# Patient Record
Sex: FEMALE | Race: WHITE
[De-identification: names, ages, dates, MRNs, and addresses within clinical notes are randomized per-mention and may not be internally consistent; named-entity substitution may affect disease eponyms.]

---

## 2018-02-13 ENCOUNTER — HOSPITAL ENCOUNTER (OUTPATIENT)
Age: 73
End: 2018-02-13
Payer: MEDICARE

## 2018-02-13 DIAGNOSIS — J40: Primary | ICD-10-CM

## 2018-02-13 PROCEDURE — 71046 X-RAY EXAM CHEST 2 VIEWS: CPT

## 2018-04-27 ENCOUNTER — HOSPITAL ENCOUNTER (OUTPATIENT)
Age: 73
End: 2018-04-27
Payer: MEDICARE

## 2018-04-27 DIAGNOSIS — E78.00: Primary | ICD-10-CM

## 2018-04-27 LAB
ALBUMIN LEVEL: 3.4 GM/DL (ref 3.4–5)
ALP ISO SERPL-ACNC: 62 U/L (ref 46–116)
ALT SERPLBLD-CCNC: 22 U/L (ref 12–78)
AST SERPL QL: 20 U/L (ref 15–37)
BILIRUB DIRECT SERPL-MCNC: 0.2 MG/DL (ref 0–0.2)
BILIRUBIN,TOTAL: 0.6 MG/DL (ref 0.2–1)
CHOLEST SPEC-SCNC: 94 MG/DL (ref 140–200)
HDLC SERPL-MCNC: 39 MG/DL (ref 29–89)
PROT SERPL-MCNC: 6.6 GM/DL (ref 6.4–8.2)
TRIGLYCERIDES: 147 MG/DL (ref 30–200)

## 2018-04-27 PROCEDURE — 80061 LIPID PANEL: CPT

## 2018-04-27 PROCEDURE — 36415 COLL VENOUS BLD VENIPUNCTURE: CPT

## 2018-04-27 PROCEDURE — 80076 HEPATIC FUNCTION PANEL: CPT

## 2018-05-10 ENCOUNTER — HOSPITAL ENCOUNTER (OUTPATIENT)
Age: 73
End: 2018-05-10
Payer: MEDICARE

## 2018-05-10 DIAGNOSIS — M25.572: Primary | ICD-10-CM

## 2018-05-10 PROCEDURE — 73610 X-RAY EXAM OF ANKLE: CPT

## 2018-07-09 ENCOUNTER — HOSPITAL ENCOUNTER (OUTPATIENT)
Age: 73
End: 2018-07-09
Payer: MEDICARE

## 2018-07-09 DIAGNOSIS — Z12.31: Primary | ICD-10-CM

## 2018-07-09 DIAGNOSIS — Z78.0: ICD-10-CM

## 2018-07-09 PROCEDURE — 77067 SCR MAMMO BI INCL CAD: CPT

## 2018-07-09 PROCEDURE — 77080 DXA BONE DENSITY AXIAL: CPT

## 2018-10-25 ENCOUNTER — HOSPITAL ENCOUNTER (OUTPATIENT)
Age: 73
End: 2018-10-25
Payer: MEDICARE

## 2018-10-25 DIAGNOSIS — E78.00: Primary | ICD-10-CM

## 2018-10-25 LAB
ALBUMIN LEVEL: 3.4 GM/DL (ref 3.4–5)
ALP ISO SERPL-ACNC: 52 U/L (ref 46–116)
ALT SERPLBLD-CCNC: 33 U/L (ref 12–78)
AST SERPL QL: 20 U/L (ref 15–37)
BILIRUB DIRECT SERPL-MCNC: 0.1 MG/DL (ref 0–0.2)
BILIRUB INDIRECT SERPL-MCNC: 0.3 MG/DL (ref 0–0.9)
BILIRUBIN,TOTAL: 0.4 MG/DL (ref 0.2–1)
CHOLEST SPEC-SCNC: 90 MG/DL (ref 140–200)
HDLC SERPL-MCNC: 54 MG/DL (ref 29–89)
PROT SERPL-MCNC: 6.3 GM/DL (ref 6.4–8.2)
TRIGLYCERIDES: 77 MG/DL (ref 30–200)

## 2018-10-25 PROCEDURE — 80061 LIPID PANEL: CPT

## 2018-10-25 PROCEDURE — 36415 COLL VENOUS BLD VENIPUNCTURE: CPT

## 2018-10-25 PROCEDURE — 80076 HEPATIC FUNCTION PANEL: CPT

## 2019-01-07 ENCOUNTER — HOSPITAL ENCOUNTER (OUTPATIENT)
Age: 74
End: 2019-01-07
Payer: MEDICARE

## 2019-01-07 DIAGNOSIS — M62.50: Primary | ICD-10-CM

## 2019-01-07 DIAGNOSIS — L89.90: ICD-10-CM

## 2019-01-07 PROCEDURE — 72050 X-RAY EXAM NECK SPINE 4/5VWS: CPT

## 2019-01-07 PROCEDURE — 71046 X-RAY EXAM CHEST 2 VIEWS: CPT

## 2019-01-11 ENCOUNTER — HOSPITAL ENCOUNTER (OUTPATIENT)
Age: 74
End: 2019-01-11
Payer: MEDICARE

## 2019-01-11 DIAGNOSIS — J44.0: Primary | ICD-10-CM

## 2019-01-11 LAB
BUN SERPL-MCNC: 18 MG/DL (ref 7–18)
CREAT BLD-SCNC: 0.9 MG/DL (ref 0.55–1.02)
ESTIMATED GLOMERULAR FILT RATE: 61 ML/MIN (ref 60–?)
GFR (AFRICAN AMERICAN): 74 ML/MIN (ref 60–?)

## 2019-01-11 PROCEDURE — 84520 ASSAY OF UREA NITROGEN: CPT

## 2019-01-11 PROCEDURE — 82565 ASSAY OF CREATININE: CPT

## 2019-01-11 PROCEDURE — 36415 COLL VENOUS BLD VENIPUNCTURE: CPT

## 2019-01-14 ENCOUNTER — HOSPITAL ENCOUNTER (OUTPATIENT)
Age: 74
End: 2019-01-14
Payer: MEDICARE

## 2019-01-14 DIAGNOSIS — R93.89: Primary | ICD-10-CM

## 2019-01-14 DIAGNOSIS — R91.8: ICD-10-CM

## 2019-01-14 PROCEDURE — 71270 CT THORAX DX C-/C+: CPT

## 2019-02-25 ENCOUNTER — HOSPITAL ENCOUNTER (OUTPATIENT)
Age: 74
End: 2019-02-25
Payer: MEDICARE

## 2019-02-25 DIAGNOSIS — E87.6: Primary | ICD-10-CM

## 2019-02-25 LAB — POTASSIUM: 4.1 MMOL/L (ref 3.5–5.1)

## 2019-02-25 PROCEDURE — 84132 ASSAY OF SERUM POTASSIUM: CPT

## 2019-02-25 PROCEDURE — 36415 COLL VENOUS BLD VENIPUNCTURE: CPT

## 2019-04-09 ENCOUNTER — HOSPITAL ENCOUNTER (OUTPATIENT)
Age: 74
End: 2019-04-09
Payer: MEDICARE

## 2019-04-09 DIAGNOSIS — R63.4: Primary | ICD-10-CM

## 2019-04-09 DIAGNOSIS — R10.30: ICD-10-CM

## 2019-04-09 LAB
BUN SERPL-MCNC: 23 MG/DL (ref 7–18)
CREAT BLD-SCNC: 1.03 MG/DL (ref 0.55–1.02)
ESTIMATED GLOMERULAR FILT RATE: 52 ML/MIN (ref 60–?)
GFR (AFRICAN AMERICAN): 63 ML/MIN (ref 60–?)

## 2019-04-09 PROCEDURE — 82565 ASSAY OF CREATININE: CPT

## 2019-04-09 PROCEDURE — 84520 ASSAY OF UREA NITROGEN: CPT

## 2019-04-09 PROCEDURE — 36415 COLL VENOUS BLD VENIPUNCTURE: CPT

## 2019-04-09 PROCEDURE — 74177 CT ABD & PELVIS W/CONTRAST: CPT

## 2019-04-11 ENCOUNTER — HOSPITAL ENCOUNTER (OUTPATIENT)
Age: 74
End: 2019-04-11
Payer: MEDICARE

## 2019-04-11 DIAGNOSIS — R22.1: Primary | ICD-10-CM

## 2019-04-11 PROCEDURE — 76536 US EXAM OF HEAD AND NECK: CPT

## 2019-04-15 ENCOUNTER — HOSPITAL ENCOUNTER (OUTPATIENT)
Age: 74
End: 2019-04-15
Payer: MEDICARE

## 2019-04-15 DIAGNOSIS — R93.5: Primary | ICD-10-CM

## 2019-04-15 PROCEDURE — 76376 3D RENDER W/INTRP POSTPROCES: CPT

## 2019-04-15 PROCEDURE — A9576 INJ PROHANCE MULTIPACK: HCPCS

## 2019-04-15 PROCEDURE — 74183 MRI ABD W/O CNTR FLWD CNTR: CPT

## 2019-06-05 ENCOUNTER — HOSPITAL ENCOUNTER (OUTPATIENT)
Age: 74
End: 2019-06-05
Payer: MEDICARE

## 2019-06-05 DIAGNOSIS — R29.6: Primary | ICD-10-CM

## 2019-06-05 DIAGNOSIS — R26.89: ICD-10-CM

## 2019-06-05 PROCEDURE — 70450 CT HEAD/BRAIN W/O DYE: CPT

## 2019-11-14 ENCOUNTER — HOSPITAL ENCOUNTER (OUTPATIENT)
Age: 74
End: 2019-11-14
Payer: MEDICARE

## 2019-11-14 DIAGNOSIS — E78.00: Primary | ICD-10-CM

## 2019-11-14 LAB
ALBUMIN LEVEL: 3.2 GM/DL (ref 3.4–5)
ALP ISO SERPL-ACNC: 69 U/L (ref 46–116)
ALT SERPLBLD-CCNC: 45 U/L (ref 12–78)
AST SERPL QL: 17 U/L (ref 15–37)
BILIRUB DIRECT SERPL-MCNC: 0.2 MG/DL (ref 0–0.2)
BILIRUB INDIRECT SERPL-MCNC: 0.5 MG/DL (ref 0–0.9)
BILIRUBIN,TOTAL: 0.7 MG/DL (ref 0.2–1)
CHOLEST SPEC-SCNC: 104 MG/DL (ref 140–200)
HDLC SERPL-MCNC: 44 MG/DL (ref 29–89)
PROT SERPL-MCNC: 6.2 GM/DL (ref 6.4–8.2)
TRIGLYCERIDES: 105 MG/DL (ref 30–200)

## 2019-11-14 PROCEDURE — 36415 COLL VENOUS BLD VENIPUNCTURE: CPT

## 2019-11-14 PROCEDURE — 80061 LIPID PANEL: CPT

## 2019-11-14 PROCEDURE — 80076 HEPATIC FUNCTION PANEL: CPT

## 2020-01-01 ENCOUNTER — INPATIENT HOSPITAL (OUTPATIENT)
Dept: RURAL HOSPITAL 5 | Facility: HOSPITAL | Age: 75
End: 2020-01-01
Payer: COMMERCIAL

## 2020-01-01 ENCOUNTER — ON CAMPUS - OUTPATIENT (OUTPATIENT)
Dept: RURAL HOSPITAL 6 | Facility: HOSPITAL | Age: 75
End: 2020-01-01
Payer: COMMERCIAL

## 2020-01-01 DIAGNOSIS — K44.9 DIAPHRAGMATIC HERNIA WITHOUT OBSTRUCTION OR GANGRENE: ICD-10-CM

## 2020-01-01 DIAGNOSIS — K86.89 OTHER SPECIFIED DISEASES OF PANCREAS: ICD-10-CM

## 2020-01-01 DIAGNOSIS — R11.2 NAUSEA WITH VOMITING, UNSPECIFIED: ICD-10-CM

## 2020-01-01 DIAGNOSIS — R06.00 DYSPNEA, UNSPECIFIED: ICD-10-CM

## 2020-01-01 DIAGNOSIS — R10.9 UNSPECIFIED ABDOMINAL PAIN: ICD-10-CM

## 2020-01-01 DIAGNOSIS — R11.0 NAUSEA: ICD-10-CM

## 2020-01-01 DIAGNOSIS — K22.4 DYSKINESIA OF ESOPHAGUS: ICD-10-CM

## 2020-01-01 DIAGNOSIS — R13.10 DYSPHAGIA, UNSPECIFIED: ICD-10-CM

## 2020-01-01 DIAGNOSIS — K21.9 GASTRO-ESOPHAGEAL REFLUX DISEASE WITHOUT ESOPHAGITIS: ICD-10-CM

## 2020-01-01 DIAGNOSIS — K30 FUNCTIONAL DYSPEPSIA: ICD-10-CM

## 2020-01-01 DIAGNOSIS — R10.13 EPIGASTRIC PAIN: ICD-10-CM

## 2020-01-01 PROCEDURE — 43261 ENDO CHOLANGIOPANCREATOGRAPH: CPT | Performed by: INTERNAL MEDICINE

## 2020-01-01 PROCEDURE — 43239 EGD BIOPSY SINGLE/MULTIPLE: CPT | Mod: 59 | Performed by: INTERNAL MEDICINE

## 2020-01-01 PROCEDURE — 43249 ESOPH EGD DILATION <30 MM: CPT | Performed by: INTERNAL MEDICINE

## 2020-01-16 ENCOUNTER — HOSPITAL ENCOUNTER (OUTPATIENT)
Age: 75
End: 2020-01-16
Payer: MEDICARE

## 2020-01-16 DIAGNOSIS — R91.1: Primary | ICD-10-CM

## 2020-01-16 LAB
BUN SERPL-MCNC: 18 MG/DL (ref 7–18)
CREAT BLD-SCNC: 1.03 MG/DL (ref 0.55–1.02)
ESTIMATED GLOMERULAR FILT RATE: 52 ML/MIN (ref 60–?)
GFR (AFRICAN AMERICAN): 63 ML/MIN (ref 60–?)

## 2020-01-16 PROCEDURE — 82565 ASSAY OF CREATININE: CPT

## 2020-01-16 PROCEDURE — 84520 ASSAY OF UREA NITROGEN: CPT

## 2020-01-16 PROCEDURE — 71260 CT THORAX DX C+: CPT

## 2020-01-16 PROCEDURE — 36415 COLL VENOUS BLD VENIPUNCTURE: CPT

## 2020-01-28 ENCOUNTER — HOSPITAL ENCOUNTER (OUTPATIENT)
Age: 75
End: 2020-01-28
Payer: MEDICARE

## 2020-01-28 DIAGNOSIS — R10.9: Primary | ICD-10-CM

## 2020-01-28 DIAGNOSIS — Z87.448: ICD-10-CM

## 2020-01-28 LAB
BUN SERPL-MCNC: 32 MG/DL (ref 7–18)
CREAT BLD-SCNC: 1.41 MG/DL (ref 0.55–1.02)
ESTIMATED GLOMERULAR FILT RATE: 36 ML/MIN (ref 60–?)
GFR (AFRICAN AMERICAN): 44 ML/MIN (ref 60–?)

## 2020-01-28 PROCEDURE — 36415 COLL VENOUS BLD VENIPUNCTURE: CPT

## 2020-01-28 PROCEDURE — 82565 ASSAY OF CREATININE: CPT

## 2020-01-28 PROCEDURE — 84520 ASSAY OF UREA NITROGEN: CPT

## 2020-01-28 PROCEDURE — 74178 CT ABD&PLV WO CNTR FLWD CNTR: CPT

## 2020-02-13 ENCOUNTER — HOSPITAL ENCOUNTER (OUTPATIENT)
Age: 75
End: 2020-02-13
Payer: MEDICARE

## 2020-02-13 DIAGNOSIS — Z79.899: ICD-10-CM

## 2020-02-13 DIAGNOSIS — Z00.00: Primary | ICD-10-CM

## 2020-02-13 LAB
ALBUMIN LEVEL: 3.2 G/DL (ref 3.4–5)
ALBUMIN/GLOB SERPL: 1.2 {RATIO} (ref 1.1–1.8)
ALP ISO SERPL-ACNC: 50 U/L (ref 46–116)
ALT SERPLBLD-CCNC: 42 U/L (ref 9–52)
ANION GAP SERPL CALC-SCNC: 13.2 MEQ/L (ref 5–15)
AST SERPL QL: 28 U/L (ref 15–37)
BILIRUBIN,TOTAL: 0.4 MG/DL (ref 0.2–1)
BUN SERPL-MCNC: 18 MG/DL (ref 7–18)
CALCIUM SPEC-MCNC: 9 MG/DL (ref 8.5–10.1)
CHLORIDE SPEC-SCNC: 103 MMOL/L (ref 98–107)
CHOLEST SPEC-SCNC: 81 MG/DL (ref 140–200)
CO2 SERPL-SCNC: 35 MMOL/L (ref 21–32)
CREAT BLD-SCNC: 0.91 MG/DL (ref 0.55–1.02)
CRP SERPL HS-MCNC: < 0.2 MG/DL (ref 0–0.9)
ESTIMATED GLOMERULAR FILT RATE: 60 ML/MIN (ref 60–?)
GFR (AFRICAN AMERICAN): 73 ML/MIN (ref 60–?)
GLOBULIN SER CALC-MCNC: 2.7 GM/DL (ref 1.3–3.2)
GLUCOSE: 93 MG/DL (ref 74–106)
HCT VFR BLD CALC: 46.9 % (ref 37–47)
HDLC SERPL-MCNC: 42 MG/DL (ref 29–89)
HGB BLD-MCNC: 15.3 G/DL (ref 12.2–16.2)
MCHC RBC-ENTMCNC: 32.7 G/DL (ref 31.8–35.4)
MCV RBC: 105.1 FL (ref 81–99)
MEAN CORPUSCULAR HEMOGLOBIN: 34.4 PG (ref 27–31.2)
PLATELET # BLD: 187 K/MM3 (ref 142–424)
POTASSIUM: 4.2 MMOL/L (ref 3.5–5.1)
PROT SERPL-MCNC: 5.9 G/DL (ref 6.4–8.2)
RBC # BLD AUTO: 4.46 M/MM3 (ref 4.2–5.4)
SODIUM SPEC-SCNC: 147 MMOL/L (ref 137–145)
T4 FREE SERPL-MCNC: 1.17 NG/DL (ref 0.76–1.46)
TRIGLYCERIDES: 104 MG/DL (ref 30–200)
TSH SERPL-ACNC: 0.62 UIU/ML (ref 0.36–3.74)
WBC # BLD AUTO: 10 K/MM3 (ref 4.8–10.8)

## 2020-02-13 PROCEDURE — 85025 COMPLETE CBC W/AUTO DIFF WBC: CPT

## 2020-02-13 PROCEDURE — 84439 ASSAY OF FREE THYROXINE: CPT

## 2020-02-13 PROCEDURE — 84443 ASSAY THYROID STIM HORMONE: CPT

## 2020-02-13 PROCEDURE — 36415 COLL VENOUS BLD VENIPUNCTURE: CPT

## 2020-02-13 PROCEDURE — 80061 LIPID PANEL: CPT

## 2020-02-13 PROCEDURE — 80053 COMPREHEN METABOLIC PANEL: CPT

## 2020-02-13 PROCEDURE — 86140 C-REACTIVE PROTEIN: CPT

## 2020-05-01 ENCOUNTER — HOSPITAL ENCOUNTER (OUTPATIENT)
Age: 75
End: 2020-05-01
Payer: MEDICARE

## 2020-05-01 DIAGNOSIS — M25.511: Primary | ICD-10-CM

## 2020-05-01 DIAGNOSIS — M53.3: ICD-10-CM

## 2020-05-01 PROCEDURE — 72220 X-RAY EXAM SACRUM TAILBONE: CPT

## 2020-05-01 PROCEDURE — 73030 X-RAY EXAM OF SHOULDER: CPT

## 2020-05-11 ENCOUNTER — HOSPITAL ENCOUNTER (OUTPATIENT)
Age: 75
End: 2020-05-11
Payer: MEDICARE

## 2020-05-11 DIAGNOSIS — R22.32: Primary | ICD-10-CM

## 2020-05-11 PROCEDURE — 76882 US LMTD JT/FCL EVL NVASC XTR: CPT

## 2020-05-26 ENCOUNTER — HOSPITAL ENCOUNTER (INPATIENT)
Dept: HOSPITAL 22 - ER | Age: 75
LOS: 1 days | Discharge: HOME | DRG: 287 | End: 2020-05-27
Payer: MEDICARE

## 2020-05-26 VITALS
HEART RATE: 76 BPM | RESPIRATION RATE: 16 BRPM | DIASTOLIC BLOOD PRESSURE: 43 MMHG | OXYGEN SATURATION: 96 % | SYSTOLIC BLOOD PRESSURE: 101 MMHG

## 2020-05-26 VITALS
OXYGEN SATURATION: 96 % | TEMPERATURE: 98.06 F | DIASTOLIC BLOOD PRESSURE: 72 MMHG | HEART RATE: 167 BPM | SYSTOLIC BLOOD PRESSURE: 104 MMHG | RESPIRATION RATE: 19 BRPM

## 2020-05-26 VITALS
OXYGEN SATURATION: 94 % | SYSTOLIC BLOOD PRESSURE: 125 MMHG | TEMPERATURE: 98.24 F | HEART RATE: 60 BPM | RESPIRATION RATE: 16 BRPM | DIASTOLIC BLOOD PRESSURE: 50 MMHG

## 2020-05-26 VITALS
OXYGEN SATURATION: 97 % | RESPIRATION RATE: 18 BRPM | SYSTOLIC BLOOD PRESSURE: 100 MMHG | HEART RATE: 64 BPM | DIASTOLIC BLOOD PRESSURE: 44 MMHG

## 2020-05-26 VITALS
RESPIRATION RATE: 20 BRPM | HEART RATE: 68 BPM | SYSTOLIC BLOOD PRESSURE: 102 MMHG | OXYGEN SATURATION: 98 % | DIASTOLIC BLOOD PRESSURE: 35 MMHG | TEMPERATURE: 98.06 F

## 2020-05-26 VITALS
OXYGEN SATURATION: 95 % | TEMPERATURE: 97.7 F | DIASTOLIC BLOOD PRESSURE: 79 MMHG | HEART RATE: 188 BPM | RESPIRATION RATE: 18 BRPM | SYSTOLIC BLOOD PRESSURE: 132 MMHG

## 2020-05-26 VITALS — DIASTOLIC BLOOD PRESSURE: 53 MMHG | SYSTOLIC BLOOD PRESSURE: 115 MMHG | HEART RATE: 59 BPM

## 2020-05-26 VITALS — HEART RATE: 57 BPM

## 2020-05-26 VITALS
OXYGEN SATURATION: 98 % | SYSTOLIC BLOOD PRESSURE: 106 MMHG | HEART RATE: 156 BPM | DIASTOLIC BLOOD PRESSURE: 75 MMHG | RESPIRATION RATE: 16 BRPM

## 2020-05-26 VITALS — OXYGEN SATURATION: 97 % | HEART RATE: 74 BPM

## 2020-05-26 VITALS
SYSTOLIC BLOOD PRESSURE: 106 MMHG | OXYGEN SATURATION: 98 % | DIASTOLIC BLOOD PRESSURE: 75 MMHG | HEART RATE: 156 BPM | RESPIRATION RATE: 16 BRPM | TEMPERATURE: 98 F

## 2020-05-26 VITALS
HEART RATE: 65 BPM | RESPIRATION RATE: 17 BRPM | SYSTOLIC BLOOD PRESSURE: 113 MMHG | OXYGEN SATURATION: 97 % | DIASTOLIC BLOOD PRESSURE: 55 MMHG

## 2020-05-26 VITALS — BODY MASS INDEX: 20.7 KG/M2 | BODY MASS INDEX: 20.5 KG/M2

## 2020-05-26 VITALS — DIASTOLIC BLOOD PRESSURE: 41 MMHG | HEART RATE: 67 BPM | SYSTOLIC BLOOD PRESSURE: 109 MMHG

## 2020-05-26 VITALS — OXYGEN SATURATION: 94 %

## 2020-05-26 DIAGNOSIS — I10: ICD-10-CM

## 2020-05-26 DIAGNOSIS — I48.91: Primary | ICD-10-CM

## 2020-05-26 DIAGNOSIS — Z88.5: ICD-10-CM

## 2020-05-26 DIAGNOSIS — Z88.0: ICD-10-CM

## 2020-05-26 DIAGNOSIS — Z95.5: ICD-10-CM

## 2020-05-26 DIAGNOSIS — J44.9: ICD-10-CM

## 2020-05-26 DIAGNOSIS — Z88.1: ICD-10-CM

## 2020-05-26 DIAGNOSIS — Z79.51: ICD-10-CM

## 2020-05-26 DIAGNOSIS — I25.118: ICD-10-CM

## 2020-05-26 DIAGNOSIS — Z79.899: ICD-10-CM

## 2020-05-26 DIAGNOSIS — I25.2: ICD-10-CM

## 2020-05-26 LAB
ANION GAP SERPL CALC-SCNC: 8.9 MEQ/L (ref 5–15)
ANISOCYTOSIS BLD QL: (no result)
BUN SERPL-MCNC: 20 MG/DL (ref 7–17)
CALCIUM SPEC-MCNC: 9.8 MG/DL (ref 8.4–10.2)
CELLS COUNTED: 100
CHLORIDE SPEC-SCNC: 99 MMOL/L (ref 98–107)
CO2 SERPL-SCNC: 35 MMOL/L (ref 22–30)
COLOR UR: YELLOW
CREAT BLD-SCNC: 0.9 MG/DL (ref 0.52–1.04)
CREATININE CLEARANCE ESTIMATED: 42 ML/MIN (ref 50–200)
ESTIMATED GLOMERULAR FILT RATE: 61 ML/MIN (ref 60–?)
GFR (AFRICAN AMERICAN): 74 ML/MIN (ref 60–?)
GLUCOSE: 221 MG/DL (ref 74–100)
HCT VFR BLD CALC: 50.4 % (ref 37–47)
HGB BLD-MCNC: 16.6 G/DL (ref 12.2–16.2)
MACROCYTES BLD QL: (no result)
MANUAL DIFFERENTIAL: (no result)
MCHC RBC-ENTMCNC: 32.9 G/DL (ref 31.8–35.4)
MCV RBC: 107.1 FL (ref 81–99)
MEAN CORPUSCULAR HEMOGLOBIN: 35.2 PG (ref 27–31.2)
MICRO URNS: (no result)
PH UR: 5.5 [PH] (ref 5–8.5)
PLATELET # BLD: 253 K/MM3 (ref 142–424)
POTASSIUM: 3.9 MMOL/L (ref 3.5–5.1)
PROT UR STRIP-MCNC: (no result) MG/DL
RBC # BLD AUTO: 4.71 M/MM3 (ref 4.2–5.4)
SODIUM SPEC-SCNC: 139 MMOL/L (ref 136–145)
SP GR UR: >= 1.03 (ref 1–1.03)
SQUAMOUS URNS QL MICRO: (no result) #/HPF (ref 0–5)
T4 (THYROXINE): 12.7 UG/DL (ref 5.53–11)
TROPONIN I: 0.08 NG/ML (ref 0–0.03)
TROPONIN I: 0.16 NG/ML (ref 0–0.03)
TROPONIN I: 0.18 NG/ML (ref 0–0.03)
TROPONIN I: 0.22 NG/ML (ref 0–0.03)
TROPONIN I: < 0.01 NG/ML (ref 0–0.03)
TSH SERPL-ACNC: 3.45 UIU/ML (ref 0.47–4.68)
UROBILINOGEN UR QL: 0.2 EU/DL
WBC # BLD AUTO: 15.5 K/MM3 (ref 4.8–10.8)
WBC # UR: (no result) #/HPF (ref 0–3)

## 2020-05-26 PROCEDURE — 99152 MOD SED SAME PHYS/QHP 5/>YRS: CPT

## 2020-05-26 PROCEDURE — 96365 THER/PROPH/DIAG IV INF INIT: CPT

## 2020-05-26 PROCEDURE — 71045 X-RAY EXAM CHEST 1 VIEW: CPT

## 2020-05-26 PROCEDURE — 93458 L HRT ARTERY/VENTRICLE ANGIO: CPT

## 2020-05-26 PROCEDURE — 83735 ASSAY OF MAGNESIUM: CPT

## 2020-05-26 PROCEDURE — 93306 TTE W/DOPPLER COMPLETE: CPT

## 2020-05-26 PROCEDURE — 99153 MOD SED SAME PHYS/QHP EA: CPT

## 2020-05-26 PROCEDURE — 80061 LIPID PANEL: CPT

## 2020-05-26 PROCEDURE — 99284 EMERGENCY DEPT VISIT MOD MDM: CPT

## 2020-05-26 PROCEDURE — 85347 COAGULATION TIME ACTIVATED: CPT

## 2020-05-26 PROCEDURE — 93005 ELECTROCARDIOGRAM TRACING: CPT

## 2020-05-26 PROCEDURE — C1725 CATH, TRANSLUMIN NON-LASER: HCPCS

## 2020-05-26 PROCEDURE — 81001 URINALYSIS AUTO W/SCOPE: CPT

## 2020-05-26 PROCEDURE — 96375 TX/PRO/DX INJ NEW DRUG ADDON: CPT

## 2020-05-26 PROCEDURE — 84484 ASSAY OF TROPONIN QUANT: CPT

## 2020-05-26 PROCEDURE — 80048 BASIC METABOLIC PNL TOTAL CA: CPT

## 2020-05-26 PROCEDURE — 84436 ASSAY OF TOTAL THYROXINE: CPT

## 2020-05-26 PROCEDURE — 94640 AIRWAY INHALATION TREATMENT: CPT

## 2020-05-26 PROCEDURE — 85025 COMPLETE CBC W/AUTO DIFF WBC: CPT

## 2020-05-26 PROCEDURE — 84443 ASSAY THYROID STIM HORMONE: CPT

## 2020-05-26 PROCEDURE — 36415 COLL VENOUS BLD VENIPUNCTURE: CPT

## 2020-05-26 PROCEDURE — C1769 GUIDE WIRE: HCPCS

## 2020-05-26 PROCEDURE — 85007 BL SMEAR W/DIFF WBC COUNT: CPT

## 2020-05-26 NOTE — PC.NURSE
PATIENT IS RESTING IN BED AT THIS TIME.  1235 TROPONIN WAS INCREASED FROM PREVIOUS.  CALLED [employer] [facility] BECAUSE THERE ARE NO OTHER FOLLOW UP TROP ORDERED.  PER [employer] I ORDERED ONE FOR 1535.  ALSO ORDERED A UA TO LOOK FOR INFECTION SOURCE.

## 2020-05-27 VITALS
RESPIRATION RATE: 18 BRPM | OXYGEN SATURATION: 87 % | DIASTOLIC BLOOD PRESSURE: 50 MMHG | SYSTOLIC BLOOD PRESSURE: 129 MMHG | HEART RATE: 67 BPM

## 2020-05-27 VITALS
DIASTOLIC BLOOD PRESSURE: 65 MMHG | OXYGEN SATURATION: 92 % | SYSTOLIC BLOOD PRESSURE: 149 MMHG | RESPIRATION RATE: 18 BRPM | HEART RATE: 72 BPM

## 2020-05-27 VITALS
DIASTOLIC BLOOD PRESSURE: 48 MMHG | SYSTOLIC BLOOD PRESSURE: 131 MMHG | OXYGEN SATURATION: 96 % | RESPIRATION RATE: 20 BRPM | HEART RATE: 70 BPM

## 2020-05-27 VITALS
SYSTOLIC BLOOD PRESSURE: 152 MMHG | RESPIRATION RATE: 19 BRPM | DIASTOLIC BLOOD PRESSURE: 65 MMHG | OXYGEN SATURATION: 93 % | TEMPERATURE: 98.06 F | HEART RATE: 80 BPM

## 2020-05-27 VITALS
OXYGEN SATURATION: 93 % | HEART RATE: 70 BPM | DIASTOLIC BLOOD PRESSURE: 65 MMHG | SYSTOLIC BLOOD PRESSURE: 148 MMHG | RESPIRATION RATE: 18 BRPM

## 2020-05-27 VITALS
SYSTOLIC BLOOD PRESSURE: 137 MMHG | RESPIRATION RATE: 20 BRPM | HEART RATE: 69 BPM | DIASTOLIC BLOOD PRESSURE: 63 MMHG | OXYGEN SATURATION: 100 %

## 2020-05-27 VITALS
DIASTOLIC BLOOD PRESSURE: 56 MMHG | OXYGEN SATURATION: 99 % | RESPIRATION RATE: 20 BRPM | HEART RATE: 69 BPM | SYSTOLIC BLOOD PRESSURE: 124 MMHG

## 2020-05-27 VITALS
OXYGEN SATURATION: 93 % | RESPIRATION RATE: 16 BRPM | SYSTOLIC BLOOD PRESSURE: 125 MMHG | HEART RATE: 70 BPM | TEMPERATURE: 98.96 F | DIASTOLIC BLOOD PRESSURE: 42 MMHG

## 2020-05-27 VITALS
OXYGEN SATURATION: 98 % | RESPIRATION RATE: 20 BRPM | SYSTOLIC BLOOD PRESSURE: 131 MMHG | HEART RATE: 73 BPM | DIASTOLIC BLOOD PRESSURE: 49 MMHG

## 2020-05-27 VITALS
HEART RATE: 68 BPM | DIASTOLIC BLOOD PRESSURE: 63 MMHG | SYSTOLIC BLOOD PRESSURE: 148 MMHG | RESPIRATION RATE: 18 BRPM | OXYGEN SATURATION: 91 %

## 2020-05-27 VITALS
DIASTOLIC BLOOD PRESSURE: 62 MMHG | RESPIRATION RATE: 20 BRPM | HEART RATE: 69 BPM | OXYGEN SATURATION: 91 % | SYSTOLIC BLOOD PRESSURE: 144 MMHG

## 2020-05-27 VITALS
RESPIRATION RATE: 16 BRPM | OXYGEN SATURATION: 86 % | DIASTOLIC BLOOD PRESSURE: 51 MMHG | HEART RATE: 66 BPM | SYSTOLIC BLOOD PRESSURE: 128 MMHG

## 2020-05-27 VITALS
SYSTOLIC BLOOD PRESSURE: 137 MMHG | DIASTOLIC BLOOD PRESSURE: 64 MMHG | RESPIRATION RATE: 18 BRPM | HEART RATE: 71 BPM | OXYGEN SATURATION: 94 %

## 2020-05-27 VITALS
HEART RATE: 65 BPM | RESPIRATION RATE: 18 BRPM | DIASTOLIC BLOOD PRESSURE: 60 MMHG | SYSTOLIC BLOOD PRESSURE: 136 MMHG | OXYGEN SATURATION: 88 %

## 2020-05-27 VITALS
RESPIRATION RATE: 18 BRPM | HEART RATE: 61 BPM | OXYGEN SATURATION: 86 % | SYSTOLIC BLOOD PRESSURE: 127 MMHG | DIASTOLIC BLOOD PRESSURE: 52 MMHG

## 2020-05-27 VITALS
HEART RATE: 70 BPM | TEMPERATURE: 98 F | DIASTOLIC BLOOD PRESSURE: 55 MMHG | OXYGEN SATURATION: 90 % | SYSTOLIC BLOOD PRESSURE: 115 MMHG

## 2020-05-27 VITALS
HEART RATE: 60 BPM | OXYGEN SATURATION: 93 % | RESPIRATION RATE: 16 BRPM | DIASTOLIC BLOOD PRESSURE: 40 MMHG | SYSTOLIC BLOOD PRESSURE: 102 MMHG | TEMPERATURE: 98.24 F

## 2020-05-27 VITALS
SYSTOLIC BLOOD PRESSURE: 140 MMHG | OXYGEN SATURATION: 95 % | DIASTOLIC BLOOD PRESSURE: 53 MMHG | HEART RATE: 70 BPM | RESPIRATION RATE: 16 BRPM | TEMPERATURE: 98.06 F

## 2020-05-27 VITALS
TEMPERATURE: 98.06 F | DIASTOLIC BLOOD PRESSURE: 67 MMHG | RESPIRATION RATE: 21 BRPM | OXYGEN SATURATION: 94 % | SYSTOLIC BLOOD PRESSURE: 161 MMHG | HEART RATE: 78 BPM

## 2020-05-27 VITALS
OXYGEN SATURATION: 89 % | HEART RATE: 68 BPM | SYSTOLIC BLOOD PRESSURE: 138 MMHG | RESPIRATION RATE: 16 BRPM | DIASTOLIC BLOOD PRESSURE: 57 MMHG

## 2020-05-27 VITALS
RESPIRATION RATE: 20 BRPM | OXYGEN SATURATION: 100 % | DIASTOLIC BLOOD PRESSURE: 62 MMHG | SYSTOLIC BLOOD PRESSURE: 127 MMHG | HEART RATE: 67 BPM

## 2020-05-27 VITALS
OXYGEN SATURATION: 90 % | RESPIRATION RATE: 18 BRPM | HEART RATE: 68 BPM | SYSTOLIC BLOOD PRESSURE: 128 MMHG | DIASTOLIC BLOOD PRESSURE: 50 MMHG

## 2020-05-27 VITALS
SYSTOLIC BLOOD PRESSURE: 121 MMHG | OXYGEN SATURATION: 97 % | DIASTOLIC BLOOD PRESSURE: 53 MMHG | RESPIRATION RATE: 20 BRPM | HEART RATE: 75 BPM

## 2020-05-27 VITALS — OXYGEN SATURATION: 96 %

## 2020-05-27 VITALS
SYSTOLIC BLOOD PRESSURE: 97 MMHG | OXYGEN SATURATION: 92 % | DIASTOLIC BLOOD PRESSURE: 40 MMHG | HEART RATE: 70 BPM | TEMPERATURE: 98.24 F | RESPIRATION RATE: 18 BRPM

## 2020-05-27 VITALS
OXYGEN SATURATION: 99 % | DIASTOLIC BLOOD PRESSURE: 59 MMHG | RESPIRATION RATE: 20 BRPM | HEART RATE: 69 BPM | SYSTOLIC BLOOD PRESSURE: 123 MMHG

## 2020-05-27 VITALS
RESPIRATION RATE: 20 BRPM | OXYGEN SATURATION: 100 % | HEART RATE: 69 BPM | DIASTOLIC BLOOD PRESSURE: 65 MMHG | SYSTOLIC BLOOD PRESSURE: 144 MMHG

## 2020-05-27 LAB
ANION GAP SERPL CALC-SCNC: 5.7 MEQ/L (ref 5–15)
BUN SERPL-MCNC: 20 MG/DL (ref 7–17)
CALCIUM SPEC-MCNC: 8.8 MG/DL (ref 8.4–10.2)
CATHL ACTIVATED CLOTTING TIME: 223 SEC (ref 74–125)
CHLORIDE SPEC-SCNC: 102 MMOL/L (ref 98–107)
CHOLEST SPEC-SCNC: 76 MG/DL (ref 140–200)
CO2 SERPL-SCNC: 36 MMOL/L (ref 22–30)
CREAT BLD-SCNC: 0.7 MG/DL (ref 0.52–1.04)
CREATININE CLEARANCE ESTIMATED: 41 ML/MIN (ref 50–200)
ESTIMATED GLOMERULAR FILT RATE: 82 ML/MIN (ref 60–?)
GFR (AFRICAN AMERICAN): 99 ML/MIN (ref 60–?)
GLUCOSE: 161 MG/DL (ref 74–100)
HCT VFR BLD CALC: 40.8 % (ref 37–47)
HDLC SERPL-MCNC: 41 MG/DL (ref 40–60)
HGB BLD-MCNC: 13.6 G/DL (ref 12.2–16.2)
MAGNESIUM: 1.6 MG/DL (ref 1.6–2.3)
MCHC RBC-ENTMCNC: 33.4 G/DL (ref 31.8–35.4)
MCV RBC: 106.3 FL (ref 81–99)
MEAN CORPUSCULAR HEMOGLOBIN: 35.6 PG (ref 27–31.2)
PLATELET # BLD: 164 K/MM3 (ref 142–424)
POTASSIUM: 3.7 MMOL/L (ref 3.5–5.1)
RBC # BLD AUTO: 3.83 M/MM3 (ref 4.2–5.4)
SODIUM SPEC-SCNC: 140 MMOL/L (ref 136–145)
TRIGLYCERIDES: 126 MG/DL (ref 30–150)
WBC # BLD AUTO: 10.7 K/MM3 (ref 4.8–10.8)

## 2020-05-27 PROCEDURE — 4A023N7 MEASUREMENT OF CARDIAC SAMPLING AND PRESSURE, LEFT HEART, PERCUTANEOUS APPROACH: ICD-10-PCS | Performed by: INTERNAL MEDICINE

## 2020-05-27 NOTE — PC.NURSE
FAMILY MEMBER CALLED THIS RN AND STATED  I AM AT [facility] IN [place] TO  DORI DICKSON'S PRESCRIPTIONS. THEY WERE SUPPOSED TO BE CALLED HERE BUT I AM HERE NOW AND THEY ARE SAYING THEY DO NOT HAVE PRESCRIPTIONS FOR HER.  THIS RN CALLED DAY

## 2020-05-27 NOTE — PC.NURSE
TRACELET DECREASED TO 0 ML AT THIS TIME AND REMOVED. NO BLEEDING PRESENT. BRUISING NOTED AT RIGHT RADIAL SITE, PULSE +2, SOFT PER PALPATION. SKIN WARM PER PALPATION, CAP REFILL <3 SEC. SITE CLEANED WITH CHLOROHEXIDINE AND TELFA, 2X2 GAUZE, AND

## 2020-05-27 NOTE — PC.NURSE
PT A&OX4. PT HAS TOLERATED 2L OF NC WELL THROUGHOUT SHIFT. EXPIRATORY AND INSPIRATORY WHEEZES NOTED THROUGHOUT. RESPIRATIONS REGULAR AND UNLABORED. DRY COUGH WITNESSED BY STAFF BUT PT REPORTS ABLE TO PRODUCE SPUTUM AT TIMES. PT PROVIDED WITH

## 2020-06-25 ENCOUNTER — HOSPITAL ENCOUNTER (OUTPATIENT)
Age: 75
End: 2020-06-25
Payer: MEDICARE

## 2020-06-25 DIAGNOSIS — E78.00: Primary | ICD-10-CM

## 2020-06-25 LAB
ALBUMIN LEVEL: 3.8 G/DL (ref 3.5–5)
ALP ISO SERPL-ACNC: 69 U/L (ref 38–126)
ALT SERPLBLD-CCNC: 46 U/L (ref 12–78)
AST SERPL QL: 38 U/L (ref 14–36)
BILIRUB DIRECT SERPL-MCNC: 0.1 MG/DL (ref 0–0.4)
BILIRUB INDIRECT SERPL-MCNC: 0.4 MG/DL (ref 0–0.9)
BILIRUB INDIRECT SERPL-MCNC: 0.4 MG/DL (ref 0–1.1)
BILIRUBIN,TOTAL: 0.5 MG/DL (ref 0.2–1.3)
CHOLEST SPEC-SCNC: 84 MG/DL (ref 140–200)
HDLC SERPL-MCNC: 53 MG/DL (ref 40–60)
PROT SERPL-MCNC: 6.4 G/DL (ref 6.3–8.2)
TRIGLYCERIDES: 106 MG/DL (ref 30–150)

## 2020-06-25 PROCEDURE — 36415 COLL VENOUS BLD VENIPUNCTURE: CPT

## 2020-06-25 PROCEDURE — 80076 HEPATIC FUNCTION PANEL: CPT

## 2020-06-25 PROCEDURE — 80061 LIPID PANEL: CPT

## 2020-07-07 ENCOUNTER — HOSPITAL ENCOUNTER (OUTPATIENT)
Age: 75
End: 2020-07-07
Payer: MEDICARE

## 2020-07-07 DIAGNOSIS — M40.00: ICD-10-CM

## 2020-07-07 DIAGNOSIS — M54.2: Primary | ICD-10-CM

## 2020-07-07 PROCEDURE — 72050 X-RAY EXAM NECK SPINE 4/5VWS: CPT

## 2020-07-08 ENCOUNTER — HOSPITAL ENCOUNTER (OUTPATIENT)
Age: 75
End: 2020-07-08
Payer: MEDICARE

## 2020-07-08 DIAGNOSIS — M54.2: ICD-10-CM

## 2020-07-08 DIAGNOSIS — Z86.19: ICD-10-CM

## 2020-07-08 DIAGNOSIS — R19.7: Primary | ICD-10-CM

## 2020-07-08 PROCEDURE — 87506 IADNA-DNA/RNA PROBE TQ 6-11: CPT

## 2020-07-23 ENCOUNTER — HOSPITAL ENCOUNTER (OUTPATIENT)
Age: 75
End: 2020-07-23
Payer: MEDICARE

## 2020-07-23 DIAGNOSIS — I50.9: ICD-10-CM

## 2020-07-23 DIAGNOSIS — J44.9: ICD-10-CM

## 2020-07-23 DIAGNOSIS — R06.02: Primary | ICD-10-CM

## 2020-07-23 LAB
ANION GAP SERPL CALC-SCNC: 12.1 MEQ/L (ref 5–15)
BUN SERPL-MCNC: 17 MG/DL (ref 7–17)
CALCIUM SPEC-MCNC: 9.5 MG/DL (ref 8.4–10.2)
CHLORIDE SPEC-SCNC: 104 MMOL/L (ref 98–107)
CO2 SERPL-SCNC: 30 MMOL/L (ref 22–30)
CREAT BLD-SCNC: 0.8 MG/DL (ref 0.52–1.04)
ESTIMATED GLOMERULAR FILT RATE: 70 ML/MIN (ref 60–?)
GFR (AFRICAN AMERICAN): 84 ML/MIN (ref 60–?)
GLUCOSE: 122 MG/DL (ref 74–100)
NT PRO BRAIN NATRIURETIC PEP.: 130 PG/ML (ref 0–450)
POTASSIUM: 4.1 MMOL/L (ref 3.5–5.1)
SODIUM SPEC-SCNC: 142 MMOL/L (ref 136–145)

## 2020-07-23 PROCEDURE — 83880 ASSAY OF NATRIURETIC PEPTIDE: CPT

## 2020-07-23 PROCEDURE — 80048 BASIC METABOLIC PNL TOTAL CA: CPT

## 2020-07-23 PROCEDURE — 36415 COLL VENOUS BLD VENIPUNCTURE: CPT

## 2020-07-23 PROCEDURE — 71046 X-RAY EXAM CHEST 2 VIEWS: CPT

## 2020-07-27 LAB — CATHL ACTIVATED CLOTTING TIME: 151 SEC (ref 74–125)

## 2020-08-11 ENCOUNTER — HOSPITAL ENCOUNTER (OUTPATIENT)
Age: 75
End: 2020-08-11
Payer: MEDICARE

## 2020-08-11 DIAGNOSIS — M47.12: Primary | ICD-10-CM

## 2020-08-11 PROCEDURE — 72125 CT NECK SPINE W/O DYE: CPT

## 2020-09-02 ENCOUNTER — HOSPITAL ENCOUNTER (OUTPATIENT)
Age: 75
End: 2020-09-02
Payer: MEDICARE

## 2020-09-02 DIAGNOSIS — E78.00: Primary | ICD-10-CM

## 2020-09-02 LAB
ALBUMIN LEVEL: 3.5 G/DL (ref 3.5–5)
ALP ISO SERPL-ACNC: 78 U/L (ref 38–126)
ALT SERPLBLD-CCNC: 23 U/L (ref 12–78)
AST SERPL QL: 27 U/L (ref 14–36)
BILIRUB DIRECT SERPL-MCNC: 0.2 MG/DL (ref 0–0.4)
BILIRUB INDIRECT SERPL-MCNC: 0.2 MG/DL (ref 0–0.9)
BILIRUB INDIRECT SERPL-MCNC: 0.2 MG/DL (ref 0–1.1)
BILIRUBIN,TOTAL: 0.4 MG/DL (ref 0.2–1.3)
CHOLEST SPEC-SCNC: 82 MG/DL (ref 140–200)
HDLC SERPL-MCNC: 40 MG/DL (ref 40–60)
PROT SERPL-MCNC: 5.9 G/DL (ref 6.3–8.2)
T4 FREE SERPL-MCNC: 1.25 NG/DL (ref 0.78–2.19)
TRIGLYCERIDES: 105 MG/DL (ref 30–150)
TSH SERPL-ACNC: 0.66 UIU/ML (ref 0.47–4.68)

## 2020-09-02 PROCEDURE — 84439 ASSAY OF FREE THYROXINE: CPT

## 2020-09-02 PROCEDURE — 36415 COLL VENOUS BLD VENIPUNCTURE: CPT

## 2020-09-02 PROCEDURE — 84481 FREE ASSAY (FT-3): CPT

## 2020-09-02 PROCEDURE — 84480 ASSAY TRIIODOTHYRONINE (T3): CPT

## 2020-09-02 PROCEDURE — 80076 HEPATIC FUNCTION PANEL: CPT

## 2020-09-02 PROCEDURE — 84443 ASSAY THYROID STIM HORMONE: CPT

## 2020-09-02 PROCEDURE — 80061 LIPID PANEL: CPT

## 2020-09-03 LAB — T3FREE SERPL-MCNC: 3.2 PG/ML (ref 2–4.4)

## 2020-10-07 ENCOUNTER — HOSPITAL ENCOUNTER (INPATIENT)
Dept: HOSPITAL 22 - ER | Age: 75
LOS: 3 days | Discharge: HOME | DRG: 436 | End: 2020-10-10
Payer: MEDICARE

## 2020-10-07 VITALS — HEART RATE: 86 BPM | OXYGEN SATURATION: 90 % | SYSTOLIC BLOOD PRESSURE: 107 MMHG | DIASTOLIC BLOOD PRESSURE: 52 MMHG

## 2020-10-07 VITALS — DIASTOLIC BLOOD PRESSURE: 46 MMHG | HEART RATE: 97 BPM | OXYGEN SATURATION: 93 % | SYSTOLIC BLOOD PRESSURE: 111 MMHG

## 2020-10-07 VITALS
BODY MASS INDEX: 21.4 KG/M2 | BODY MASS INDEX: 21.3 KG/M2 | BODY MASS INDEX: 18.8 KG/M2 | BODY MASS INDEX: 21.6 KG/M2 | BODY MASS INDEX: 21.2 KG/M2 | HEART RATE: 187 BPM | DIASTOLIC BLOOD PRESSURE: 84 MMHG | TEMPERATURE: 97.52 F | OXYGEN SATURATION: 95 % | RESPIRATION RATE: 33 BRPM | SYSTOLIC BLOOD PRESSURE: 111 MMHG

## 2020-10-07 VITALS
RESPIRATION RATE: 16 BRPM | HEART RATE: 75 BPM | DIASTOLIC BLOOD PRESSURE: 47 MMHG | TEMPERATURE: 98.42 F | OXYGEN SATURATION: 96 % | SYSTOLIC BLOOD PRESSURE: 106 MMHG

## 2020-10-07 VITALS — OXYGEN SATURATION: 92 % | SYSTOLIC BLOOD PRESSURE: 90 MMHG | DIASTOLIC BLOOD PRESSURE: 50 MMHG | HEART RATE: 165 BPM

## 2020-10-07 VITALS — DIASTOLIC BLOOD PRESSURE: 42 MMHG | HEART RATE: 110 BPM | SYSTOLIC BLOOD PRESSURE: 95 MMHG | OXYGEN SATURATION: 90 %

## 2020-10-07 VITALS — HEART RATE: 147 BPM | SYSTOLIC BLOOD PRESSURE: 101 MMHG | DIASTOLIC BLOOD PRESSURE: 55 MMHG | OXYGEN SATURATION: 95 %

## 2020-10-07 VITALS
HEART RATE: 93 BPM | SYSTOLIC BLOOD PRESSURE: 101 MMHG | TEMPERATURE: 98.42 F | RESPIRATION RATE: 18 BRPM | DIASTOLIC BLOOD PRESSURE: 52 MMHG | OXYGEN SATURATION: 93 %

## 2020-10-07 VITALS — DIASTOLIC BLOOD PRESSURE: 52 MMHG | OXYGEN SATURATION: 93 % | HEART RATE: 97 BPM | SYSTOLIC BLOOD PRESSURE: 93 MMHG

## 2020-10-07 VITALS — SYSTOLIC BLOOD PRESSURE: 116 MMHG | HEART RATE: 88 BPM | DIASTOLIC BLOOD PRESSURE: 45 MMHG | OXYGEN SATURATION: 95 %

## 2020-10-07 VITALS — DIASTOLIC BLOOD PRESSURE: 38 MMHG | OXYGEN SATURATION: 92 % | HEART RATE: 82 BPM | SYSTOLIC BLOOD PRESSURE: 109 MMHG

## 2020-10-07 VITALS — DIASTOLIC BLOOD PRESSURE: 60 MMHG | HEART RATE: 158 BPM | OXYGEN SATURATION: 93 % | SYSTOLIC BLOOD PRESSURE: 97 MMHG

## 2020-10-07 VITALS
OXYGEN SATURATION: 96 % | DIASTOLIC BLOOD PRESSURE: 41 MMHG | RESPIRATION RATE: 16 BRPM | HEART RATE: 88 BPM | SYSTOLIC BLOOD PRESSURE: 109 MMHG

## 2020-10-07 VITALS
HEART RATE: 76 BPM | DIASTOLIC BLOOD PRESSURE: 51 MMHG | SYSTOLIC BLOOD PRESSURE: 120 MMHG | OXYGEN SATURATION: 94 % | RESPIRATION RATE: 20 BRPM

## 2020-10-07 VITALS — SYSTOLIC BLOOD PRESSURE: 111 MMHG | DIASTOLIC BLOOD PRESSURE: 42 MMHG | OXYGEN SATURATION: 92 % | HEART RATE: 98 BPM

## 2020-10-07 VITALS — OXYGEN SATURATION: 97 % | HEART RATE: 174 BPM | DIASTOLIC BLOOD PRESSURE: 54 MMHG | SYSTOLIC BLOOD PRESSURE: 97 MMHG

## 2020-10-07 VITALS
DIASTOLIC BLOOD PRESSURE: 38 MMHG | RESPIRATION RATE: 20 BRPM | SYSTOLIC BLOOD PRESSURE: 109 MMHG | HEART RATE: 82 BPM | OXYGEN SATURATION: 92 % | TEMPERATURE: 97.52 F

## 2020-10-07 VITALS — OXYGEN SATURATION: 92 % | DIASTOLIC BLOOD PRESSURE: 45 MMHG | HEART RATE: 103 BPM | SYSTOLIC BLOOD PRESSURE: 114 MMHG

## 2020-10-07 VITALS — HEART RATE: 93 BPM | OXYGEN SATURATION: 92 % | RESPIRATION RATE: 18 BRPM

## 2020-10-07 VITALS — OXYGEN SATURATION: 99 % | SYSTOLIC BLOOD PRESSURE: 100 MMHG | HEART RATE: 167 BPM | DIASTOLIC BLOOD PRESSURE: 56 MMHG

## 2020-10-07 VITALS — HEART RATE: 70 BPM

## 2020-10-07 DIAGNOSIS — I10: ICD-10-CM

## 2020-10-07 DIAGNOSIS — I25.10: ICD-10-CM

## 2020-10-07 DIAGNOSIS — Z95.5: ICD-10-CM

## 2020-10-07 DIAGNOSIS — Z79.84: ICD-10-CM

## 2020-10-07 DIAGNOSIS — E11.9: ICD-10-CM

## 2020-10-07 DIAGNOSIS — Z88.1: ICD-10-CM

## 2020-10-07 DIAGNOSIS — C78.7: ICD-10-CM

## 2020-10-07 DIAGNOSIS — E78.5: ICD-10-CM

## 2020-10-07 DIAGNOSIS — Z85.3: ICD-10-CM

## 2020-10-07 DIAGNOSIS — I48.91: ICD-10-CM

## 2020-10-07 DIAGNOSIS — Z72.0: ICD-10-CM

## 2020-10-07 DIAGNOSIS — Z88.0: ICD-10-CM

## 2020-10-07 DIAGNOSIS — I25.2: ICD-10-CM

## 2020-10-07 DIAGNOSIS — C25.1: Primary | ICD-10-CM

## 2020-10-07 LAB
ALBUMIN LEVEL: 3.4 G/DL (ref 3.5–5)
ALP ISO SERPL-ACNC: 157 U/L (ref 38–126)
ALT SERPLBLD-CCNC: 28 U/L (ref 12–78)
AMYLASE SERPL-CCNC: 53 U/L (ref 30–110)
ANION GAP SERPL CALC-SCNC: 16.2 MEQ/L (ref 5–15)
ANISOCYTOSIS BLD QL: (no result)
AST SERPL QL: 36 U/L (ref 14–36)
BACTERIA URNS QL MICRO: (no result) /LPF
BILIRUB DIRECT SERPL-MCNC: 0.3 MG/DL (ref 0–0.4)
BILIRUB INDIRECT SERPL-MCNC: 0.4 MG/DL (ref 0–0.9)
BILIRUB INDIRECT SERPL-MCNC: 0.4 MG/DL (ref 0–1.1)
BILIRUB UR STRIP-MCNC: (no result) MG/DL
BILIRUBIN,TOTAL: 0.7 MG/DL (ref 0.2–1.3)
BUN SERPL-MCNC: 20 MG/DL (ref 7–17)
CALCIUM SPEC-MCNC: 10.4 MG/DL (ref 8.4–10.2)
CELLS COUNTED: 100
CHLORIDE SPEC-SCNC: 93 MMOL/L (ref 98–107)
CO2 SERPL-SCNC: 32 MMOL/L (ref 22–30)
COLOR UR: YELLOW
CREAT BLD-SCNC: 1 MG/DL (ref 0.52–1.04)
CREATININE CLEARANCE ESTIMATED: 36 ML/MIN (ref 50–200)
ESTIMATED GLOMERULAR FILT RATE: 54 ML/MIN (ref 60–?)
GFR (AFRICAN AMERICAN): 65 ML/MIN (ref 60–?)
GLUCOSE BLDC GLUCOMTR-MCNC: 111 MG/DL (ref 70–110)
GLUCOSE BLDC GLUCOMTR-MCNC: 149 MG/DL (ref 70–110)
GLUCOSE: 152 MG/DL (ref 74–100)
HCT VFR BLD CALC: 50.5 % (ref 37–47)
HGB BLD-MCNC: 16.3 G/DL (ref 12.2–16.2)
KETONES UR STRIP.AUTO-MCNC: (no result) MG/DL
LACTATE SERPL-MCNC: 1.5 MMOL/L (ref 0.7–2.1)
LIPASE: 65 U/L (ref 23–300)
MACROCYTES BLD QL: (no result)
MANUAL DIFFERENTIAL: (no result)
MCHC RBC-ENTMCNC: 32.2 G/DL (ref 31.8–35.4)
MCV RBC: 106.3 FL (ref 81–99)
MEAN CORPUSCULAR HEMOGLOBIN: 34.2 PG (ref 27–31.2)
MICRO URNS: (no result)
PH UR: 6 [PH] (ref 5–8.5)
PLATELET # BLD: 172 K/MM3 (ref 142–424)
POTASSIUM: 3.2 MMOL/L (ref 3.5–5.1)
PROT SERPL-MCNC: 6.2 G/DL (ref 6.3–8.2)
PROT UR STRIP-MCNC: (no result) MG/DL
RBC # BLD AUTO: 4.75 M/MM3 (ref 4.2–5.4)
RBC #/AREA URNS HPF: (no result) #/HPF (ref 0–3)
REFLEX LACTIC: (no result)
SODIUM SPEC-SCNC: 138 MMOL/L (ref 136–145)
SP GR UR: 1.01 (ref 1–1.03)
TROPONIN I: 0.01 NG/ML (ref 0–0.03)
TROPONIN I: 0.17 NG/ML (ref 0–0.03)
TROPONIN I: 0.23 NG/ML (ref 0–0.03)
UROBILINOGEN UR QL: 0.2 EU/DL
WBC # BLD AUTO: 21.7 K/MM3 (ref 4.8–10.8)
WBC # UR: (no result) #/HPF (ref 0–3)

## 2020-10-07 PROCEDURE — 87186 SC STD MICRODIL/AGAR DIL: CPT

## 2020-10-07 PROCEDURE — 87077 CULTURE AEROBIC IDENTIFY: CPT

## 2020-10-07 PROCEDURE — 85007 BL SMEAR W/DIFF WBC COUNT: CPT

## 2020-10-07 PROCEDURE — 82378 CARCINOEMBRYONIC ANTIGEN: CPT

## 2020-10-07 PROCEDURE — 43260 ERCP W/SPECIMEN COLLECTION: CPT

## 2020-10-07 PROCEDURE — 43239 EGD BIOPSY SINGLE/MULTIPLE: CPT

## 2020-10-07 PROCEDURE — 36415 COLL VENOUS BLD VENIPUNCTURE: CPT

## 2020-10-07 PROCEDURE — 86316 IMMUNOASSAY TUMOR OTHER: CPT

## 2020-10-07 PROCEDURE — 83690 ASSAY OF LIPASE: CPT

## 2020-10-07 PROCEDURE — 88305 TISSUE EXAM BY PATHOLOGIST: CPT

## 2020-10-07 PROCEDURE — 74330 X-RAY BILE/PANC ENDOSCOPY: CPT

## 2020-10-07 PROCEDURE — 93005 ELECTROCARDIOGRAM TRACING: CPT

## 2020-10-07 PROCEDURE — 84484 ASSAY OF TROPONIN QUANT: CPT

## 2020-10-07 PROCEDURE — 71045 X-RAY EXAM CHEST 1 VIEW: CPT

## 2020-10-07 PROCEDURE — 87086 URINE CULTURE/COLONY COUNT: CPT

## 2020-10-07 PROCEDURE — 82947 ASSAY GLUCOSE BLOOD QUANT: CPT

## 2020-10-07 PROCEDURE — 81001 URINALYSIS AUTO W/SCOPE: CPT

## 2020-10-07 PROCEDURE — 87040 BLOOD CULTURE FOR BACTERIA: CPT

## 2020-10-07 PROCEDURE — 85025 COMPLETE CBC W/AUTO DIFF WBC: CPT

## 2020-10-07 PROCEDURE — 96375 TX/PRO/DX INJ NEW DRUG ADDON: CPT

## 2020-10-07 PROCEDURE — 74177 CT ABD & PELVIS W/CONTRAST: CPT

## 2020-10-07 PROCEDURE — 82150 ASSAY OF AMYLASE: CPT

## 2020-10-07 PROCEDURE — 82962 GLUCOSE BLOOD TEST: CPT

## 2020-10-07 PROCEDURE — 86328 IA NFCT AB SARSCOV2 COVID19: CPT

## 2020-10-07 PROCEDURE — 94640 AIRWAY INHALATION TREATMENT: CPT

## 2020-10-07 PROCEDURE — 94761 N-INVAS EAR/PLS OXIMETRY MLT: CPT

## 2020-10-07 PROCEDURE — 80048 BASIC METABOLIC PNL TOTAL CA: CPT

## 2020-10-07 PROCEDURE — 80076 HEPATIC FUNCTION PANEL: CPT

## 2020-10-07 PROCEDURE — 83605 ASSAY OF LACTIC ACID: CPT

## 2020-10-07 PROCEDURE — 80053 COMPREHEN METABOLIC PANEL: CPT

## 2020-10-07 PROCEDURE — 96365 THER/PROPH/DIAG IV INF INIT: CPT

## 2020-10-07 PROCEDURE — 96367 TX/PROPH/DG ADDL SEQ IV INF: CPT

## 2020-10-07 PROCEDURE — 99284 EMERGENCY DEPT VISIT MOD MDM: CPT

## 2020-10-07 NOTE — PC.NURSE
Pt family member states that pt was taking cardizem and eliquis but she stopped because she didnt want to take them

## 2020-10-08 VITALS
TEMPERATURE: 98.5 F | DIASTOLIC BLOOD PRESSURE: 41 MMHG | SYSTOLIC BLOOD PRESSURE: 109 MMHG | RESPIRATION RATE: 18 BRPM | HEART RATE: 70 BPM | OXYGEN SATURATION: 93 %

## 2020-10-08 VITALS — TEMPERATURE: 98.3 F | HEART RATE: 80 BPM

## 2020-10-08 VITALS
SYSTOLIC BLOOD PRESSURE: 101 MMHG | OXYGEN SATURATION: 91 % | DIASTOLIC BLOOD PRESSURE: 68 MMHG | HEART RATE: 70 BPM | TEMPERATURE: 98.06 F | RESPIRATION RATE: 20 BRPM

## 2020-10-08 VITALS — OXYGEN SATURATION: 95 % | SYSTOLIC BLOOD PRESSURE: 110 MMHG | HEART RATE: 60 BPM | DIASTOLIC BLOOD PRESSURE: 69 MMHG

## 2020-10-08 VITALS
TEMPERATURE: 97.88 F | RESPIRATION RATE: 20 BRPM | OXYGEN SATURATION: 93 % | HEART RATE: 69 BPM | DIASTOLIC BLOOD PRESSURE: 53 MMHG | SYSTOLIC BLOOD PRESSURE: 118 MMHG

## 2020-10-08 VITALS — HEART RATE: 70 BPM | RESPIRATION RATE: 18 BRPM | DIASTOLIC BLOOD PRESSURE: 43 MMHG | SYSTOLIC BLOOD PRESSURE: 108 MMHG

## 2020-10-08 VITALS — HEART RATE: 70 BPM

## 2020-10-08 VITALS — HEART RATE: 87 BPM

## 2020-10-08 LAB
ANION GAP SERPL CALC-SCNC: 5.7 MEQ/L (ref 5–15)
ANISOCYTOSIS BLD QL: (no result)
BUN SERPL-MCNC: 20 MG/DL (ref 7–17)
CALCIUM SPEC-MCNC: 8.2 MG/DL (ref 8.4–10.2)
CELLS COUNTED: 100
CHLORIDE SPEC-SCNC: 104 MMOL/L (ref 98–107)
CO2 SERPL-SCNC: 34 MMOL/L (ref 22–30)
CREAT BLD-SCNC: 0.8 MG/DL (ref 0.52–1.04)
CREATININE CLEARANCE ESTIMATED: 40 ML/MIN (ref 50–200)
ESTIMATED GLOMERULAR FILT RATE: 70 ML/MIN (ref 60–?)
GFR (AFRICAN AMERICAN): 84 ML/MIN (ref 60–?)
GLUCOSE BLDC GLUCOMTR-MCNC: 111 MG/DL (ref 70–110)
GLUCOSE BLDC GLUCOMTR-MCNC: 185 MG/DL (ref 70–110)
GLUCOSE BLDC GLUCOMTR-MCNC: 94 MG/DL (ref 70–110)
GLUCOSE: 94 MG/DL (ref 74–100)
HCT VFR BLD CALC: 38.1 % (ref 37–47)
HCT VFR BLD CALC: 39.4 % (ref 37–47)
HGB BLD-MCNC: 11.4 G/DL (ref 12.2–16.2)
HGB BLD-MCNC: 12.1 G/DL (ref 12.2–16.2)
MACROCYTES BLD QL: (no result)
MANUAL DIFFERENTIAL: (no result)
MCHC RBC-ENTMCNC: 29.9 G/DL (ref 31.8–35.4)
MCHC RBC-ENTMCNC: 30.5 G/DL (ref 31.8–35.4)
MCV RBC: 106.5 FL (ref 81–99)
MCV RBC: 108.7 FL (ref 81–99)
MEAN CORPUSCULAR HEMOGLOBIN: 31.8 PG (ref 27–31.2)
MEAN CORPUSCULAR HEMOGLOBIN: 33.2 PG (ref 27–31.2)
PLATELET # BLD: 182 K/MM3 (ref 142–424)
PLATELET # BLD: 184 K/MM3 (ref 142–424)
POTASSIUM: 3.7 MMOL/L (ref 3.5–5.1)
RBC # BLD AUTO: 3.58 M/MM3 (ref 4.2–5.4)
RBC # BLD AUTO: 3.62 M/MM3 (ref 4.2–5.4)
SODIUM SPEC-SCNC: 140 MMOL/L (ref 136–145)
WBC # BLD AUTO: 13.1 K/MM3 (ref 4.8–10.8)
WBC # BLD AUTO: 15 K/MM3 (ref 4.8–10.8)

## 2020-10-08 NOTE — [facility].PHAINT
MEDICATION RECONCILIATION COMPLETED ON PATIENT USING EXTERNAL FILL HISTORY FROM PHARMACY AND LIST FROM MD OFFICE.

## 2020-10-08 NOTE — PC.NURSE
THIS RN SPOKE WITH PATIENT'S DAUGHTER THIS AM IN REGARDS TO BRINGING IN PATIENT'S INHALER.  DAUGHTER BROUGHT INHALER AND WHEN HANDING IT OVER TO THIS RN, PATIENT'S DAUGHTER STATED,   OH I GAVE HER A DOSE   THIS RN INFORMED PHARMACY.

## 2020-10-09 VITALS — OXYGEN SATURATION: 94 % | HEART RATE: 80 BPM

## 2020-10-09 VITALS
DIASTOLIC BLOOD PRESSURE: 68 MMHG | HEART RATE: 78 BPM | SYSTOLIC BLOOD PRESSURE: 150 MMHG | TEMPERATURE: 97.34 F | OXYGEN SATURATION: 96 % | RESPIRATION RATE: 20 BRPM

## 2020-10-09 VITALS
OXYGEN SATURATION: 97 % | RESPIRATION RATE: 20 BRPM | TEMPERATURE: 97.3 F | DIASTOLIC BLOOD PRESSURE: 70 MMHG | HEART RATE: 78 BPM | SYSTOLIC BLOOD PRESSURE: 120 MMHG

## 2020-10-09 VITALS
RESPIRATION RATE: 16 BRPM | SYSTOLIC BLOOD PRESSURE: 163 MMHG | HEART RATE: 77 BPM | DIASTOLIC BLOOD PRESSURE: 67 MMHG | OXYGEN SATURATION: 90 %

## 2020-10-09 VITALS
RESPIRATION RATE: 20 BRPM | HEART RATE: 82 BPM | SYSTOLIC BLOOD PRESSURE: 122 MMHG | DIASTOLIC BLOOD PRESSURE: 53 MMHG | OXYGEN SATURATION: 97 % | TEMPERATURE: 98 F

## 2020-10-09 VITALS
RESPIRATION RATE: 16 BRPM | SYSTOLIC BLOOD PRESSURE: 151 MMHG | OXYGEN SATURATION: 90 % | DIASTOLIC BLOOD PRESSURE: 61 MMHG | HEART RATE: 80 BPM

## 2020-10-09 VITALS
RESPIRATION RATE: 20 BRPM | OXYGEN SATURATION: 95 % | DIASTOLIC BLOOD PRESSURE: 50 MMHG | HEART RATE: 74 BPM | SYSTOLIC BLOOD PRESSURE: 117 MMHG

## 2020-10-09 VITALS
HEART RATE: 70 BPM | RESPIRATION RATE: 20 BRPM | SYSTOLIC BLOOD PRESSURE: 105 MMHG | OXYGEN SATURATION: 92 % | TEMPERATURE: 98.4 F | DIASTOLIC BLOOD PRESSURE: 45 MMHG

## 2020-10-09 VITALS
OXYGEN SATURATION: 93 % | RESPIRATION RATE: 19 BRPM | HEART RATE: 83 BPM | SYSTOLIC BLOOD PRESSURE: 126 MMHG | TEMPERATURE: 98.6 F | DIASTOLIC BLOOD PRESSURE: 58 MMHG

## 2020-10-09 VITALS
HEART RATE: 102 BPM | RESPIRATION RATE: 18 BRPM | TEMPERATURE: 97.6 F | DIASTOLIC BLOOD PRESSURE: 94 MMHG | OXYGEN SATURATION: 92 % | SYSTOLIC BLOOD PRESSURE: 154 MMHG

## 2020-10-09 VITALS
OXYGEN SATURATION: 93 % | SYSTOLIC BLOOD PRESSURE: 107 MMHG | DIASTOLIC BLOOD PRESSURE: 54 MMHG | RESPIRATION RATE: 19 BRPM | HEART RATE: 79 BPM | TEMPERATURE: 98.24 F

## 2020-10-09 VITALS
HEART RATE: 78 BPM | SYSTOLIC BLOOD PRESSURE: 118 MMHG | RESPIRATION RATE: 22 BRPM | OXYGEN SATURATION: 94 % | DIASTOLIC BLOOD PRESSURE: 52 MMHG

## 2020-10-09 VITALS
HEART RATE: 78 BPM | RESPIRATION RATE: 20 BRPM | SYSTOLIC BLOOD PRESSURE: 119 MMHG | TEMPERATURE: 98.24 F | OXYGEN SATURATION: 95 % | DIASTOLIC BLOOD PRESSURE: 53 MMHG

## 2020-10-09 VITALS
HEART RATE: 86 BPM | TEMPERATURE: 98.2 F | DIASTOLIC BLOOD PRESSURE: 49 MMHG | OXYGEN SATURATION: 94 % | SYSTOLIC BLOOD PRESSURE: 120 MMHG | RESPIRATION RATE: 20 BRPM

## 2020-10-09 VITALS
DIASTOLIC BLOOD PRESSURE: 67 MMHG | SYSTOLIC BLOOD PRESSURE: 152 MMHG | OXYGEN SATURATION: 90 % | HEART RATE: 79 BPM | RESPIRATION RATE: 16 BRPM

## 2020-10-09 VITALS
RESPIRATION RATE: 18 BRPM | SYSTOLIC BLOOD PRESSURE: 115 MMHG | DIASTOLIC BLOOD PRESSURE: 56 MMHG | TEMPERATURE: 98.1 F | OXYGEN SATURATION: 100 % | HEART RATE: 82 BPM

## 2020-10-09 VITALS
OXYGEN SATURATION: 96 % | TEMPERATURE: 98.06 F | RESPIRATION RATE: 20 BRPM | HEART RATE: 79 BPM | DIASTOLIC BLOOD PRESSURE: 64 MMHG | SYSTOLIC BLOOD PRESSURE: 125 MMHG

## 2020-10-09 VITALS
DIASTOLIC BLOOD PRESSURE: 48 MMHG | TEMPERATURE: 97 F | HEART RATE: 74 BPM | RESPIRATION RATE: 24 BRPM | OXYGEN SATURATION: 97 % | SYSTOLIC BLOOD PRESSURE: 125 MMHG

## 2020-10-09 VITALS — HEART RATE: 90 BPM | OXYGEN SATURATION: 95 %

## 2020-10-09 VITALS
HEART RATE: 75 BPM | SYSTOLIC BLOOD PRESSURE: 150 MMHG | RESPIRATION RATE: 16 BRPM | DIASTOLIC BLOOD PRESSURE: 62 MMHG | OXYGEN SATURATION: 92 %

## 2020-10-09 VITALS — HEART RATE: 75 BPM

## 2020-10-09 VITALS
DIASTOLIC BLOOD PRESSURE: 51 MMHG | OXYGEN SATURATION: 96 % | HEART RATE: 78 BPM | SYSTOLIC BLOOD PRESSURE: 121 MMHG | RESPIRATION RATE: 20 BRPM

## 2020-10-09 VITALS
RESPIRATION RATE: 16 BRPM | SYSTOLIC BLOOD PRESSURE: 158 MMHG | DIASTOLIC BLOOD PRESSURE: 61 MMHG | HEART RATE: 96 BPM | OXYGEN SATURATION: 92 %

## 2020-10-09 VITALS
DIASTOLIC BLOOD PRESSURE: 63 MMHG | HEART RATE: 79 BPM | RESPIRATION RATE: 18 BRPM | SYSTOLIC BLOOD PRESSURE: 157 MMHG | OXYGEN SATURATION: 94 %

## 2020-10-09 VITALS
TEMPERATURE: 98.3 F | HEART RATE: 74 BPM | OXYGEN SATURATION: 100 % | RESPIRATION RATE: 18 BRPM | DIASTOLIC BLOOD PRESSURE: 45 MMHG | SYSTOLIC BLOOD PRESSURE: 128 MMHG

## 2020-10-09 VITALS
HEART RATE: 83 BPM | DIASTOLIC BLOOD PRESSURE: 58 MMHG | OXYGEN SATURATION: 90 % | SYSTOLIC BLOOD PRESSURE: 144 MMHG | RESPIRATION RATE: 16 BRPM

## 2020-10-09 VITALS
TEMPERATURE: 98.4 F | DIASTOLIC BLOOD PRESSURE: 40 MMHG | OXYGEN SATURATION: 92 % | SYSTOLIC BLOOD PRESSURE: 118 MMHG | HEART RATE: 60 BPM | RESPIRATION RATE: 22 BRPM

## 2020-10-09 VITALS — OXYGEN SATURATION: 92 %

## 2020-10-09 LAB
ALBUMIN LEVEL: 2.5 G/DL (ref 3.5–5)
ALBUMIN/GLOB SERPL: 1 {RATIO} (ref 1.1–1.8)
ALP ISO SERPL-ACNC: 143 U/L (ref 38–126)
ALT SERPLBLD-CCNC: 18 U/L (ref 12–78)
ANION GAP SERPL CALC-SCNC: 4.8 MEQ/L (ref 5–15)
AST SERPL QL: 35 U/L (ref 14–36)
BILIRUBIN,TOTAL: 0.3 MG/DL (ref 0.2–1.3)
BUN SERPL-MCNC: 17 MG/DL (ref 7–17)
CA 19-9: 1325 U/ML (ref 0–35)
CALCIUM SPEC-MCNC: 8 MG/DL (ref 8.4–10.2)
CHLORIDE SPEC-SCNC: 106 MMOL/L (ref 98–107)
CO2 SERPL-SCNC: 33 MMOL/L (ref 22–30)
CREAT BLD-SCNC: 0.8 MG/DL (ref 0.52–1.04)
CREATININE CLEARANCE ESTIMATED: 40 ML/MIN (ref 50–200)
ESTIMATED GLOMERULAR FILT RATE: 70 ML/MIN (ref 60–?)
GFR (AFRICAN AMERICAN): 84 ML/MIN (ref 60–?)
GLOBULIN SER CALC-MCNC: 2.4 G/DL (ref 1.3–3.2)
GLUCOSE BLDC GLUCOMTR-MCNC: 114 MG/DL (ref 70–110)
GLUCOSE BLDC GLUCOMTR-MCNC: 116 MG/DL (ref 70–110)
GLUCOSE BLDC GLUCOMTR-MCNC: 117 MG/DL (ref 70–110)
GLUCOSE BLDC GLUCOMTR-MCNC: 134 MG/DL (ref 70–110)
GLUCOSE BLDC GLUCOMTR-MCNC: 137 MG/DL (ref 70–110)
GLUCOSE: 120 MG/DL (ref 74–100)
HCT VFR BLD CALC: 39.1 % (ref 37–47)
HGB BLD-MCNC: 11.9 G/DL (ref 12.2–16.2)
MCHC RBC-ENTMCNC: 30.3 G/DL (ref 31.8–35.4)
MCV RBC: 109.3 FL (ref 81–99)
MEAN CORPUSCULAR HEMOGLOBIN: 33.1 PG (ref 27–31.2)
PLATELET # BLD: 191 K/MM3 (ref 142–424)
POTASSIUM: 3.8 MMOL/L (ref 3.5–5.1)
PROT SERPL-MCNC: 4.9 G/DL (ref 6.3–8.2)
RBC # BLD AUTO: 3.58 M/MM3 (ref 4.2–5.4)
SODIUM SPEC-SCNC: 140 MMOL/L (ref 136–145)
WBC # BLD AUTO: 12 K/MM3 (ref 4.8–10.8)

## 2020-10-09 PROCEDURE — 0DB48ZX EXCISION OF ESOPHAGOGASTRIC JUNCTION, VIA NATURAL OR ARTIFICIAL OPENING ENDOSCOPIC, DIAGNOSTIC: ICD-10-PCS | Performed by: INTERNAL MEDICINE

## 2020-10-09 NOTE — PC.NURSE
PATIENT A&O X4, LUNGS DIMINISHED, PATIENT UNABLE TO DEEP BREATHE, NOT A NEW FINDING.  PATIENT HAS MORE BRUISING ON UPPER EXTREMITIES.  PATIENT HAS TOLERATED ICE AND CHIPS, CLEAR FLUIDS AND FULL LIQUIDS THUS FAR.

## 2020-10-09 NOTE — PC.NURSE
Administered Trelegy Ellipta, Pt's home DPI, Pt tolerated well.  HR 74, RR 16, BBS equal with diminished aeration, SPO2 90% on 3 LPM NC.  Will continue to monitor

## 2020-10-09 NOTE — PC.NURSE
A&OX3.  EQUAL BILAT. LUNGS NOTED CLEAR T/O AUSCULTATION. TOLERATED 3LNC WELL. USED INCENTIVE SPIROMETER Q1HWA. ABDOMEN NONDISTENDED, ACTIVE BOWEL SOUNDS, SOFT AND TENDER PER PALPATION ON LEFT PORTION OF ABDOMEN. NO BM NOTED THUS FAR IN SHIFT.

## 2020-10-10 VITALS — OXYGEN SATURATION: 84 % | HEART RATE: 80 BPM

## 2020-10-10 VITALS
OXYGEN SATURATION: 91 % | RESPIRATION RATE: 19 BRPM | TEMPERATURE: 97.7 F | HEART RATE: 85 BPM | SYSTOLIC BLOOD PRESSURE: 103 MMHG | DIASTOLIC BLOOD PRESSURE: 60 MMHG

## 2020-10-10 VITALS
SYSTOLIC BLOOD PRESSURE: 112 MMHG | OXYGEN SATURATION: 93 % | TEMPERATURE: 98.42 F | DIASTOLIC BLOOD PRESSURE: 43 MMHG | HEART RATE: 70 BPM | RESPIRATION RATE: 16 BRPM

## 2020-10-10 VITALS — RESPIRATION RATE: 18 BRPM | TEMPERATURE: 98 F | OXYGEN SATURATION: 94 % | HEART RATE: 80 BPM

## 2020-10-10 VITALS — HEART RATE: 85 BPM | RESPIRATION RATE: 20 BRPM | OXYGEN SATURATION: 91 %

## 2020-10-10 VITALS
HEART RATE: 80 BPM | RESPIRATION RATE: 18 BRPM | OXYGEN SATURATION: 96 % | TEMPERATURE: 98.24 F | DIASTOLIC BLOOD PRESSURE: 46 MMHG | SYSTOLIC BLOOD PRESSURE: 118 MMHG

## 2020-10-10 VITALS — OXYGEN SATURATION: 90 % | HEART RATE: 76 BPM

## 2020-10-10 VITALS
RESPIRATION RATE: 20 BRPM | TEMPERATURE: 98.5 F | SYSTOLIC BLOOD PRESSURE: 118 MMHG | OXYGEN SATURATION: 92 % | HEART RATE: 77 BPM | DIASTOLIC BLOOD PRESSURE: 73 MMHG

## 2020-10-10 VITALS — HEART RATE: 80 BPM

## 2020-10-10 VITALS — OXYGEN SATURATION: 95 % | HEART RATE: 85 BPM

## 2020-10-10 VITALS — HEART RATE: 81 BPM

## 2020-10-10 VITALS — HEART RATE: 100 BPM

## 2020-10-10 VITALS — SYSTOLIC BLOOD PRESSURE: 120 MMHG | DIASTOLIC BLOOD PRESSURE: 50 MMHG

## 2020-10-10 VITALS — OXYGEN SATURATION: 84 %

## 2020-10-10 LAB
ALBUMIN LEVEL: 2.7 G/DL (ref 3.5–5)
ALBUMIN/GLOB SERPL: 1 {RATIO} (ref 1.1–1.8)
ALP ISO SERPL-ACNC: 130 U/L (ref 38–126)
ALT SERPLBLD-CCNC: 19 U/L (ref 12–78)
ANION GAP SERPL CALC-SCNC: 5.3 MEQ/L (ref 5–15)
AST SERPL QL: 33 U/L (ref 14–36)
BILIRUBIN,TOTAL: 0.3 MG/DL (ref 0.2–1.3)
BUN SERPL-MCNC: 18 MG/DL (ref 7–17)
CA 19-9: 868 U/ML (ref 0–35)
CALCIUM SPEC-MCNC: 8.1 MG/DL (ref 8.4–10.2)
CEA: 6.2 NG/ML (ref 0–4.7)
CELLS COUNTED: 100
CHLORIDE SPEC-SCNC: 104 MMOL/L (ref 98–107)
CO2 SERPL-SCNC: 34 MMOL/L (ref 22–30)
CREAT BLD-SCNC: 0.6 MG/DL (ref 0.52–1.04)
CREATININE CLEARANCE ESTIMATED: 39 ML/MIN (ref 50–200)
ESTIMATED GLOMERULAR FILT RATE: 97 ML/MIN (ref 60–?)
GFR (AFRICAN AMERICAN): 118 ML/MIN (ref 60–?)
GLOBULIN SER CALC-MCNC: 2.6 G/DL (ref 1.3–3.2)
GLUCOSE BLDC GLUCOMTR-MCNC: 204 MG/DL (ref 70–110)
GLUCOSE BLDC GLUCOMTR-MCNC: 209 MG/DL (ref 70–110)
GLUCOSE: 220 MG/DL (ref 74–100)
HCT VFR BLD CALC: 39.9 % (ref 37–47)
HGB BLD-MCNC: 12.2 G/DL (ref 12.2–16.2)
MANUAL DIFFERENTIAL: (no result)
MCHC RBC-ENTMCNC: 30.6 G/DL (ref 31.8–35.4)
MCV RBC: 109.2 FL (ref 81–99)
MEAN CORPUSCULAR HEMOGLOBIN: 33.4 PG (ref 27–31.2)
PLATELET # BLD: 181 K/MM3 (ref 142–424)
POTASSIUM: 4.3 MMOL/L (ref 3.5–5.1)
PROT SERPL-MCNC: 5.3 G/DL (ref 6.3–8.2)
RBC # BLD AUTO: 3.66 M/MM3 (ref 4.2–5.4)
SODIUM SPEC-SCNC: 139 MMOL/L (ref 136–145)
WBC # BLD AUTO: 6.4 K/MM3 (ref 4.8–10.8)

## 2020-10-10 NOTE — PC.NURSE
Per daughter and SRNA, the patient went to the BR on RA.  Came back to bed and was SOA.  Was given an unscheduled breathing tx and extension tubing was placed on her O2 tubing.

## 2020-10-10 NOTE — PC.NURSE
She is A&Ox4.  [place] but wears hearing aids. She has been resting in bed.  No BM this shift.  She is aware of the need for a stool.  She is voiding per f/c.  Urine is yellow, clear.  Denies pain.  Denies SOA.  Reports weakness and tenderness in her

## 2020-10-10 NOTE — PC.NURSE
Pt's daughter has voiced frustration over pt not having a port.  States she doesn't know why she can't have one.  Pt's daughter was agitated when labs were drawn. She wanted labs drawn from IV.  Attempted to educated her on why labs are not drawn

## 2020-10-12 LAB — GLUCOSE BLDC GLUCOMTR-MCNC: 332 MG/DL (ref 70–110)

## 2020-10-13 ENCOUNTER — HOSPITAL ENCOUNTER (EMERGENCY)
Dept: HOSPITAL 22 - ER | Age: 75
Discharge: HOME | End: 2020-10-13
Payer: MEDICARE

## 2020-10-13 ENCOUNTER — HOSPITAL ENCOUNTER (INPATIENT)
Dept: HOSPITAL 22 - ER | Age: 75
LOS: 1 days | DRG: 435 | End: 2020-10-14
Payer: MEDICARE

## 2020-10-13 VITALS
OXYGEN SATURATION: 91 % | RESPIRATION RATE: 24 BRPM | SYSTOLIC BLOOD PRESSURE: 125 MMHG | DIASTOLIC BLOOD PRESSURE: 50 MMHG | HEART RATE: 78 BPM

## 2020-10-13 VITALS
DIASTOLIC BLOOD PRESSURE: 34 MMHG | OXYGEN SATURATION: 94 % | RESPIRATION RATE: 17 BRPM | HEART RATE: 83 BPM | SYSTOLIC BLOOD PRESSURE: 127 MMHG

## 2020-10-13 VITALS — BODY MASS INDEX: 21.7 KG/M2

## 2020-10-13 VITALS
SYSTOLIC BLOOD PRESSURE: 139 MMHG | HEART RATE: 76 BPM | DIASTOLIC BLOOD PRESSURE: 54 MMHG | RESPIRATION RATE: 18 BRPM | OXYGEN SATURATION: 93 %

## 2020-10-13 VITALS
DIASTOLIC BLOOD PRESSURE: 62 MMHG | RESPIRATION RATE: 18 BRPM | SYSTOLIC BLOOD PRESSURE: 104 MMHG | HEART RATE: 170 BPM | OXYGEN SATURATION: 96 %

## 2020-10-13 VITALS
SYSTOLIC BLOOD PRESSURE: 105 MMHG | DIASTOLIC BLOOD PRESSURE: 56 MMHG | OXYGEN SATURATION: 99 % | RESPIRATION RATE: 15 BRPM | HEART RATE: 89 BPM | TEMPERATURE: 98.5 F

## 2020-10-13 VITALS
HEART RATE: 80 BPM | RESPIRATION RATE: 17 BRPM | SYSTOLIC BLOOD PRESSURE: 115 MMHG | DIASTOLIC BLOOD PRESSURE: 40 MMHG | OXYGEN SATURATION: 93 %

## 2020-10-13 VITALS
HEART RATE: 75 BPM | TEMPERATURE: 98.3 F | OXYGEN SATURATION: 92 % | RESPIRATION RATE: 19 BRPM | SYSTOLIC BLOOD PRESSURE: 110 MMHG | DIASTOLIC BLOOD PRESSURE: 39 MMHG

## 2020-10-13 VITALS
RESPIRATION RATE: 24 BRPM | OXYGEN SATURATION: 93 % | DIASTOLIC BLOOD PRESSURE: 54 MMHG | SYSTOLIC BLOOD PRESSURE: 113 MMHG | HEART RATE: 183 BPM

## 2020-10-13 VITALS
SYSTOLIC BLOOD PRESSURE: 92 MMHG | RESPIRATION RATE: 12 BRPM | TEMPERATURE: 98.3 F | DIASTOLIC BLOOD PRESSURE: 52 MMHG | HEART RATE: 88 BPM | OXYGEN SATURATION: 98 %

## 2020-10-13 VITALS
BODY MASS INDEX: 23 KG/M2 | SYSTOLIC BLOOD PRESSURE: 103 MMHG | RESPIRATION RATE: 16 BRPM | HEART RATE: 74 BPM | DIASTOLIC BLOOD PRESSURE: 64 MMHG | OXYGEN SATURATION: 94 % | TEMPERATURE: 97.52 F

## 2020-10-13 VITALS
HEART RATE: 99 BPM | SYSTOLIC BLOOD PRESSURE: 123 MMHG | RESPIRATION RATE: 17 BRPM | OXYGEN SATURATION: 95 % | DIASTOLIC BLOOD PRESSURE: 43 MMHG

## 2020-10-13 VITALS — OXYGEN SATURATION: 93 % | SYSTOLIC BLOOD PRESSURE: 126 MMHG | DIASTOLIC BLOOD PRESSURE: 43 MMHG | HEART RATE: 71 BPM

## 2020-10-13 VITALS
SYSTOLIC BLOOD PRESSURE: 108 MMHG | OXYGEN SATURATION: 92 % | HEART RATE: 192 BPM | RESPIRATION RATE: 22 BRPM | DIASTOLIC BLOOD PRESSURE: 64 MMHG

## 2020-10-13 VITALS
OXYGEN SATURATION: 93 % | DIASTOLIC BLOOD PRESSURE: 34 MMHG | RESPIRATION RATE: 17 BRPM | HEART RATE: 75 BPM | SYSTOLIC BLOOD PRESSURE: 121 MMHG

## 2020-10-13 VITALS
HEART RATE: 67 BPM | DIASTOLIC BLOOD PRESSURE: 44 MMHG | SYSTOLIC BLOOD PRESSURE: 112 MMHG | OXYGEN SATURATION: 96 % | RESPIRATION RATE: 20 BRPM

## 2020-10-13 VITALS
SYSTOLIC BLOOD PRESSURE: 121 MMHG | RESPIRATION RATE: 24 BRPM | OXYGEN SATURATION: 96 % | HEART RATE: 173 BPM | DIASTOLIC BLOOD PRESSURE: 56 MMHG

## 2020-10-13 VITALS — OXYGEN SATURATION: 95 %

## 2020-10-13 VITALS — OXYGEN SATURATION: 94 %

## 2020-10-13 DIAGNOSIS — C78.7: ICD-10-CM

## 2020-10-13 DIAGNOSIS — Z79.899: ICD-10-CM

## 2020-10-13 DIAGNOSIS — Z95.5: ICD-10-CM

## 2020-10-13 DIAGNOSIS — Z88.1: ICD-10-CM

## 2020-10-13 DIAGNOSIS — Z72.0: ICD-10-CM

## 2020-10-13 DIAGNOSIS — C25.1: ICD-10-CM

## 2020-10-13 DIAGNOSIS — J44.9: ICD-10-CM

## 2020-10-13 DIAGNOSIS — Z88.0: ICD-10-CM

## 2020-10-13 DIAGNOSIS — N17.9: ICD-10-CM

## 2020-10-13 DIAGNOSIS — Z85.3: ICD-10-CM

## 2020-10-13 DIAGNOSIS — E78.5: ICD-10-CM

## 2020-10-13 DIAGNOSIS — I48.0: ICD-10-CM

## 2020-10-13 DIAGNOSIS — I11.0: ICD-10-CM

## 2020-10-13 DIAGNOSIS — I25.2: ICD-10-CM

## 2020-10-13 DIAGNOSIS — I50.9: ICD-10-CM

## 2020-10-13 DIAGNOSIS — J18.9: ICD-10-CM

## 2020-10-13 DIAGNOSIS — F17.210: ICD-10-CM

## 2020-10-13 DIAGNOSIS — I48.91: ICD-10-CM

## 2020-10-13 DIAGNOSIS — K52.9: ICD-10-CM

## 2020-10-13 DIAGNOSIS — C77.9: ICD-10-CM

## 2020-10-13 DIAGNOSIS — Z79.84: ICD-10-CM

## 2020-10-13 DIAGNOSIS — Z88.5: ICD-10-CM

## 2020-10-13 DIAGNOSIS — I10: ICD-10-CM

## 2020-10-13 DIAGNOSIS — E11.65: ICD-10-CM

## 2020-10-13 DIAGNOSIS — C25.9: Primary | ICD-10-CM

## 2020-10-13 DIAGNOSIS — R19.7: ICD-10-CM

## 2020-10-13 DIAGNOSIS — Z01.84: ICD-10-CM

## 2020-10-13 LAB
ALBUMIN LEVEL: 3.2 G/DL (ref 3.5–5)
ALP ISO SERPL-ACNC: 120 U/L (ref 38–126)
ALT SERPLBLD-CCNC: 64 U/L (ref 12–78)
ANION GAP SERPL CALC-SCNC: 10.1 MEQ/L (ref 5–15)
ANION GAP SERPL CALC-SCNC: 5.9 MEQ/L (ref 5–15)
AST SERPL QL: 60 U/L (ref 14–36)
BACTERIA URNS QL MICRO: (no result) /LPF
BILIRUB DIRECT SERPL-MCNC: 0.5 MG/DL (ref 0–0.4)
BILIRUB INDIRECT SERPL-MCNC: 0.2 MG/DL (ref 0–0.9)
BILIRUB INDIRECT SERPL-MCNC: 0.3 MG/DL (ref 0–1.1)
BILIRUB UR STRIP-MCNC: (no result) MG/DL
BILIRUBIN,TOTAL: 0.7 MG/DL (ref 0.2–1.3)
BUN SERPL-MCNC: 36 MG/DL (ref 7–17)
BUN SERPL-MCNC: 41 MG/DL (ref 7–17)
CALCIUM SPEC-MCNC: 7.8 MG/DL (ref 8.4–10.2)
CALCIUM SPEC-MCNC: 8.5 MG/DL (ref 8.4–10.2)
CELLS COUNTED: 100
CELLS COUNTED: 100
CHLORIDE SPEC-SCNC: 102 MMOL/L (ref 98–107)
CHLORIDE SPEC-SCNC: 108 MMOL/L (ref 98–107)
CO2 SERPL-SCNC: 29 MMOL/L (ref 22–30)
CO2 SERPL-SCNC: 31 MMOL/L (ref 22–30)
COLOR UR: (no result)
CREAT BLD-SCNC: 1.1 MG/DL (ref 0.52–1.04)
CREAT BLD-SCNC: 1.2 MG/DL (ref 0.52–1.04)
CREATININE CLEARANCE ESTIMATED: 37 ML/MIN (ref 50–200)
CREATININE CLEARANCE ESTIMATED: 37 ML/MIN (ref 50–200)
ESTIMATED GLOMERULAR FILT RATE: 44 ML/MIN (ref 60–?)
ESTIMATED GLOMERULAR FILT RATE: 48 ML/MIN (ref 60–?)
GFR (AFRICAN AMERICAN): 53 ML/MIN (ref 60–?)
GFR (AFRICAN AMERICAN): 58 ML/MIN (ref 60–?)
GLUCOSE: 148 MG/DL (ref 74–100)
GLUCOSE: 188 MG/DL (ref 74–100)
HCT VFR BLD CALC: 41.2 % (ref 37–47)
HCT VFR BLD CALC: 42.9 % (ref 37–47)
HGB BLD-MCNC: 12.3 G/DL (ref 12.2–16.2)
HGB BLD-MCNC: 13.1 G/DL (ref 12.2–16.2)
HYPERSEGMENTED NEUTROPHILS: (no result)
HYPOCHROMIA BLD QL: (no result)
KETONES UR STRIP.AUTO-MCNC: (no result) MG/DL
MACROCYTES BLD QL: (no result)
MANUAL DIFFERENTIAL: (no result)
MANUAL DIFFERENTIAL: (no result)
MCHC RBC-ENTMCNC: 29.8 G/DL (ref 31.8–35.4)
MCHC RBC-ENTMCNC: 30.5 G/DL (ref 31.8–35.4)
MCV RBC: 108.8 FL (ref 81–99)
MCV RBC: 112.3 FL (ref 81–99)
MEAN CORPUSCULAR HEMOGLOBIN: 33.2 PG (ref 27–31.2)
MEAN CORPUSCULAR HEMOGLOBIN: 33.4 PG (ref 27–31.2)
MICRO URNS: (no result)
PH UR: 5 [PH] (ref 5–8.5)
PLATELET # BLD: 266 K/MM3 (ref 142–424)
PLATELET # BLD: 291 K/MM3 (ref 142–424)
POTASSIUM: 4.1 MMOL/L (ref 3.5–5.1)
POTASSIUM: 4.9 MMOL/L (ref 3.5–5.1)
PROT SERPL-MCNC: 5.9 G/DL (ref 6.3–8.2)
PROT UR STRIP-MCNC: (no result) MG/DL
RBC # BLD AUTO: 3.67 M/MM3 (ref 4.2–5.4)
RBC # BLD AUTO: 3.94 M/MM3 (ref 4.2–5.4)
SODIUM SPEC-SCNC: 138 MMOL/L (ref 136–145)
SODIUM SPEC-SCNC: 139 MMOL/L (ref 136–145)
SP GR UR: >= 1.03 (ref 1–1.03)
STOMATOCYTES BLD QL SMEAR: (no result)
TROPONIN I: 0.04 NG/ML (ref 0–0.03)
UROBILINOGEN UR QL: 1 EU/DL
WBC # BLD AUTO: 16.9 K/MM3 (ref 4.8–10.8)
WBC # BLD AUTO: 29.3 K/MM3 (ref 4.8–10.8)

## 2020-10-13 PROCEDURE — 80048 BASIC METABOLIC PNL TOTAL CA: CPT

## 2020-10-13 PROCEDURE — 71045 X-RAY EXAM CHEST 1 VIEW: CPT

## 2020-10-13 PROCEDURE — 87506 IADNA-DNA/RNA PROBE TQ 6-11: CPT

## 2020-10-13 PROCEDURE — 96366 THER/PROPH/DIAG IV INF ADDON: CPT

## 2020-10-13 PROCEDURE — 36415 COLL VENOUS BLD VENIPUNCTURE: CPT

## 2020-10-13 PROCEDURE — 85007 BL SMEAR W/DIFF WBC COUNT: CPT

## 2020-10-13 PROCEDURE — 96365 THER/PROPH/DIAG IV INF INIT: CPT

## 2020-10-13 PROCEDURE — 84484 ASSAY OF TROPONIN QUANT: CPT

## 2020-10-13 PROCEDURE — 87086 URINE CULTURE/COLONY COUNT: CPT

## 2020-10-13 PROCEDURE — 96367 TX/PROPH/DG ADDL SEQ IV INF: CPT

## 2020-10-13 PROCEDURE — 86328 IA NFCT AB SARSCOV2 COVID19: CPT

## 2020-10-13 PROCEDURE — 85025 COMPLETE CBC W/AUTO DIFF WBC: CPT

## 2020-10-13 PROCEDURE — 87040 BLOOD CULTURE FOR BACTERIA: CPT

## 2020-10-13 PROCEDURE — 99283 EMERGENCY DEPT VISIT LOW MDM: CPT

## 2020-10-13 PROCEDURE — 93005 ELECTROCARDIOGRAM TRACING: CPT

## 2020-10-13 PROCEDURE — 96375 TX/PRO/DX INJ NEW DRUG ADDON: CPT

## 2020-10-13 PROCEDURE — 81001 URINALYSIS AUTO W/SCOPE: CPT

## 2020-10-13 PROCEDURE — 83605 ASSAY OF LACTIC ACID: CPT

## 2020-10-13 PROCEDURE — 80076 HEPATIC FUNCTION PANEL: CPT

## 2020-10-13 PROCEDURE — 99285 EMERGENCY DEPT VISIT HI MDM: CPT

## 2020-10-13 NOTE — PC.NURSE
SPEAKING WITH TELMA RE: VANC DOSAGE.. ORDER RECEIVED FOR VANCOMYCIN 1.25 GM LD AND 1 GM IV Q12 FOLLOWING THAT.

## 2020-10-13 NOTE — PC.NURSE
pt up to bedside commode at this time. transferred wiht two person  minimal assist. continues on cardiac monitor

## 2020-10-13 NOTE — PC.NURSE
was informed by lab at this time that stool wouldn't result for 2-3 days as it gets sent out of house now.

## 2020-10-13 NOTE — PC.NURSE
call placed second floor, spoke with surinder, requested to have dr castañeda call ed. call returned.

## 2020-10-14 VITALS
TEMPERATURE: 97.9 F | OXYGEN SATURATION: 91 % | HEART RATE: 84 BPM | SYSTOLIC BLOOD PRESSURE: 118 MMHG | RESPIRATION RATE: 11 BRPM | DIASTOLIC BLOOD PRESSURE: 47 MMHG

## 2020-10-14 VITALS
OXYGEN SATURATION: 95 % | TEMPERATURE: 98.24 F | DIASTOLIC BLOOD PRESSURE: 43 MMHG | RESPIRATION RATE: 20 BRPM | HEART RATE: 84 BPM | SYSTOLIC BLOOD PRESSURE: 104 MMHG

## 2020-10-14 LAB
ANISOCYTOSIS BLD QL: (no result)
CELLS COUNTED: 100
HCT VFR BLD CALC: 43.1 % (ref 37–47)
HGB BLD-MCNC: 12.8 G/DL (ref 12.2–16.2)
HYPERSEGMENTED NEUTROPHILS: (no result)
MACROCYTES BLD QL: (no result)
MANUAL DIFFERENTIAL: (no result)
MCHC RBC-ENTMCNC: 29.7 G/DL (ref 31.8–35.4)
MCV RBC: 115.2 FL (ref 81–99)
MEAN CORPUSCULAR HEMOGLOBIN: 34.2 PG (ref 27–31.2)
PLATELET # BLD: 241 K/MM3 (ref 142–424)
RBC # BLD AUTO: 3.74 M/MM3 (ref 4.2–5.4)
WBC # BLD AUTO: 23.7 K/MM3 (ref 4.8–10.8)

## 2020-10-14 NOTE — PC.NURSE
MADE DR. MADRID AWARE OF PULSES NOT PALPABLE AND APNEA NOTED. DR. MADRID STATED  PLEASE HAVE THE ER PHYSICIAN COME UP TO PRONOUNCE.

## 2020-10-14 NOTE — PC.NURSE
08:38 Daughter asked staff to come and check on her mother, she stated she didn't think she was breathing. DOROTA Bowens RN and myself both went in to check and neither one of us were able to palpate a pulse. DOROTA Bowens notified Dr. Mayfield and he

## 2020-10-14 NOTE — PC.NURSE
SINCE ADMISSION, PT HAS REMAINED OBTUNDED. UNABLE TO VERBALLY COMMUNICATE/ MAKE NEEDS KNOWN BUT ABLE TO FOLLOW COMMANDS FOR EXAMPLE, PT ABLE TO  BILAT HANDS ON DEMAND.  EYES OPEN AND PT GROANS AND STATES  HEY  WITH STERNAL RUB. PUPILS NOTED